# Patient Record
Sex: FEMALE | Race: WHITE | Employment: OTHER | URBAN - NONMETROPOLITAN AREA
[De-identification: names, ages, dates, MRNs, and addresses within clinical notes are randomized per-mention and may not be internally consistent; named-entity substitution may affect disease eponyms.]

---

## 2017-03-22 ENCOUNTER — HOSPITAL ENCOUNTER (OUTPATIENT)
Age: 62
Discharge: HOME | End: 2017-03-22
Payer: COMMERCIAL

## 2017-03-22 DIAGNOSIS — Z00.00 ANNUAL PHYSICAL EXAM: ICD-10-CM

## 2017-03-22 LAB
-: ABNORMAL
AMORPHOUS: ABNORMAL
BACTERIA: ABNORMAL
BILIRUBIN URINE: NEGATIVE
CASTS UA: ABNORMAL /LPF
COLOR: YELLOW
COMMENT UA: ABNORMAL
CRYSTALS, UA: ABNORMAL /HPF
EPITHELIAL CELLS UA: ABNORMAL /HPF (ref 0–25)
GLUCOSE URINE: NEGATIVE
KETONES, URINE: NEGATIVE
LEUKOCYTE ESTERASE, URINE: NEGATIVE
MUCUS: ABNORMAL
NITRITE, URINE: NEGATIVE
OTHER OBSERVATIONS UA: ABNORMAL
PH UA: 5.5 (ref 5–9)
PROTEIN UA: ABNORMAL
RBC UA: ABNORMAL /HPF (ref 0–2)
RENAL EPITHELIAL, UA: ABNORMAL /HPF
SPECIFIC GRAVITY UA: 1.02 (ref 1.01–1.02)
TRICHOMONAS: ABNORMAL
TURBIDITY: CLEAR
URINE HGB: ABNORMAL
UROBILINOGEN, URINE: NORMAL
WBC UA: ABNORMAL /HPF (ref 0–5)
YEAST: ABNORMAL

## 2017-03-22 PROCEDURE — 81001 URINALYSIS AUTO W/SCOPE: CPT

## 2017-03-22 PROCEDURE — G0145 SCR C/V CYTO,THINLAYER,RESCR: HCPCS

## 2017-03-27 LAB — CYTOLOGY REPORT: NORMAL

## 2017-03-28 ENCOUNTER — HOSPITAL ENCOUNTER (OUTPATIENT)
Age: 62
Setting detail: SPECIMEN
Discharge: HOME | End: 2017-03-28
Payer: COMMERCIAL

## 2017-03-28 ENCOUNTER — OFFICE VISIT (OUTPATIENT)
Dept: UROLOGY | Age: 62
End: 2017-03-28
Payer: COMMERCIAL

## 2017-03-28 VITALS
TEMPERATURE: 98.1 F | HEIGHT: 66 IN | SYSTOLIC BLOOD PRESSURE: 144 MMHG | WEIGHT: 220 LBS | BODY MASS INDEX: 35.36 KG/M2 | DIASTOLIC BLOOD PRESSURE: 84 MMHG

## 2017-03-28 DIAGNOSIS — R31.29 MICROHEMATURIA: Primary | ICD-10-CM

## 2017-03-28 DIAGNOSIS — Z87.442 PERSONAL HISTORY OF KIDNEY STONES: ICD-10-CM

## 2017-03-28 DIAGNOSIS — R10.9 LEFT FLANK PAIN: ICD-10-CM

## 2017-03-28 DIAGNOSIS — N39.41 URGE INCONTINENCE: ICD-10-CM

## 2017-03-28 PROCEDURE — 99204 OFFICE O/P NEW MOD 45 MIN: CPT | Performed by: PHYSICIAN ASSISTANT

## 2017-03-28 PROCEDURE — 88112 CYTOPATH CELL ENHANCE TECH: CPT

## 2017-03-28 ASSESSMENT — ENCOUNTER SYMPTOMS
DIARRHEA: 0
NAUSEA: 0
ABDOMINAL DISTENTION: 0
SHORTNESS OF BREATH: 0
EYE PAIN: 0
ABDOMINAL PAIN: 0
CONSTIPATION: 0
VOMITING: 0
COUGH: 0

## 2017-03-29 LAB
CASE NUMBER:: NORMAL
SPECIMEN DESCRIPTION: NORMAL
SURGICAL PATHOLOGY REPORT: NORMAL

## 2017-04-06 ENCOUNTER — HOSPITAL ENCOUNTER (OUTPATIENT)
Dept: CT IMAGING | Age: 62
Discharge: HOME | End: 2017-04-06
Payer: COMMERCIAL

## 2017-04-06 DIAGNOSIS — Z87.442 PERSONAL HISTORY OF KIDNEY STONES: ICD-10-CM

## 2017-04-06 DIAGNOSIS — R10.9 LEFT FLANK PAIN: ICD-10-CM

## 2017-04-06 DIAGNOSIS — R31.29 MICROHEMATURIA: ICD-10-CM

## 2017-04-06 PROCEDURE — 6360000004 HC RX CONTRAST MEDICATION: Performed by: PHYSICIAN ASSISTANT

## 2017-04-06 PROCEDURE — 74178 CT ABD&PLV WO CNTR FLWD CNTR: CPT

## 2017-04-06 RX ADMIN — IOPAMIDOL 100 ML: 612 INJECTION, SOLUTION INTRAVENOUS at 12:22

## 2017-04-24 ENCOUNTER — HOSPITAL ENCOUNTER (OUTPATIENT)
Age: 62
Discharge: HOME | End: 2017-04-24
Payer: COMMERCIAL

## 2017-04-24 ENCOUNTER — OFFICE VISIT (OUTPATIENT)
Dept: UROLOGY | Age: 62
End: 2017-04-24
Payer: COMMERCIAL

## 2017-04-24 ENCOUNTER — HOSPITAL ENCOUNTER (OUTPATIENT)
Age: 62
Setting detail: SPECIMEN
Discharge: HOME | End: 2017-04-24
Payer: COMMERCIAL

## 2017-04-24 ENCOUNTER — HOSPITAL ENCOUNTER (OUTPATIENT)
Dept: GENERAL RADIOLOGY | Age: 62
Discharge: HOME | End: 2017-04-24
Payer: COMMERCIAL

## 2017-04-24 VITALS
TEMPERATURE: 97.9 F | WEIGHT: 218 LBS | BODY MASS INDEX: 35.73 KG/M2 | DIASTOLIC BLOOD PRESSURE: 88 MMHG | SYSTOLIC BLOOD PRESSURE: 130 MMHG

## 2017-04-24 DIAGNOSIS — N20.0 RENAL STONES: ICD-10-CM

## 2017-04-24 DIAGNOSIS — R31.9 HEMATURIA: ICD-10-CM

## 2017-04-24 DIAGNOSIS — Z01.818 PRE-OP TESTING: ICD-10-CM

## 2017-04-24 DIAGNOSIS — N20.0 RENAL STONES: Primary | ICD-10-CM

## 2017-04-24 LAB
-: ABNORMAL
ABSOLUTE EOS #: 0.2 K/UL (ref 0–0.4)
ABSOLUTE LYMPH #: 2.3 K/UL (ref 1–4.8)
ABSOLUTE MONO #: 0.7 K/UL (ref 0–1)
AMORPHOUS: ABNORMAL
ANION GAP SERPL CALCULATED.3IONS-SCNC: 14 MMOL/L (ref 9–17)
BACTERIA: ABNORMAL
BASOPHILS # BLD: 1 %
BASOPHILS ABSOLUTE: 0.1 K/UL (ref 0–0.2)
BILIRUBIN URINE: NEGATIVE
BUN BLDV-MCNC: 16 MG/DL (ref 8–23)
BUN/CREAT BLD: 21 (ref 9–20)
CALCIUM SERPL-MCNC: 9.7 MG/DL (ref 8.6–10.4)
CASTS UA: ABNORMAL /LPF
CHLORIDE BLD-SCNC: 102 MMOL/L (ref 98–107)
CO2: 27 MMOL/L (ref 20–31)
COLOR: YELLOW
COMMENT UA: ABNORMAL
CREAT SERPL-MCNC: 0.76 MG/DL (ref 0.5–0.9)
CRYSTALS, UA: ABNORMAL /HPF
DIFFERENTIAL TYPE: NORMAL
EOSINOPHILS RELATIVE PERCENT: 2 %
EPITHELIAL CELLS UA: ABNORMAL /HPF (ref 0–25)
GFR AFRICAN AMERICAN: >60 ML/MIN
GFR NON-AFRICAN AMERICAN: >60 ML/MIN
GFR SERPL CREATININE-BSD FRML MDRD: ABNORMAL ML/MIN/{1.73_M2}
GFR SERPL CREATININE-BSD FRML MDRD: ABNORMAL ML/MIN/{1.73_M2}
GLUCOSE BLD-MCNC: 85 MG/DL (ref 70–99)
GLUCOSE URINE: NEGATIVE
HCT VFR BLD CALC: 42.8 % (ref 36–46)
HEMOGLOBIN: 14.1 G/DL (ref 12–16)
KETONES, URINE: NEGATIVE
LEUKOCYTE ESTERASE, URINE: ABNORMAL
LYMPHOCYTES # BLD: 27 %
MCH RBC QN AUTO: 29.2 PG (ref 26–34)
MCHC RBC AUTO-ENTMCNC: 32.9 G/DL (ref 31–37)
MCV RBC AUTO: 88.7 FL (ref 80–100)
MONOCYTES # BLD: 8 %
MUCUS: ABNORMAL
NITRITE, URINE: NEGATIVE
OTHER OBSERVATIONS UA: ABNORMAL
PDW BLD-RTO: 13.2 % (ref 12.1–15.2)
PH UA: 5.5 (ref 5–9)
PLATELET # BLD: 269 K/UL (ref 140–450)
PLATELET ESTIMATE: NORMAL
PMV BLD AUTO: NORMAL FL (ref 6–12)
POTASSIUM SERPL-SCNC: 4 MMOL/L (ref 3.7–5.3)
PROTEIN UA: ABNORMAL
RBC # BLD: 4.83 M/UL (ref 4–5.2)
RBC # BLD: NORMAL 10*6/UL
RBC UA: ABNORMAL /HPF (ref 0–2)
RENAL EPITHELIAL, UA: ABNORMAL /HPF
SEG NEUTROPHILS: 62 %
SEGMENTED NEUTROPHILS ABSOLUTE COUNT: 5.4 K/UL (ref 1.8–7.7)
SODIUM BLD-SCNC: 143 MMOL/L (ref 135–144)
SPECIFIC GRAVITY UA: >1.03 (ref 1.01–1.02)
TRICHOMONAS: ABNORMAL
TURBIDITY: ABNORMAL
URINE HGB: ABNORMAL
UROBILINOGEN, URINE: NORMAL
WBC # BLD: 8.7 K/UL (ref 3.5–11)
WBC # BLD: NORMAL 10*3/UL
WBC UA: ABNORMAL /HPF (ref 0–5)
YEAST: ABNORMAL

## 2017-04-24 PROCEDURE — 36415 COLL VENOUS BLD VENIPUNCTURE: CPT

## 2017-04-24 PROCEDURE — 80048 BASIC METABOLIC PNL TOTAL CA: CPT

## 2017-04-24 PROCEDURE — 99214 OFFICE O/P EST MOD 30 MIN: CPT | Performed by: UROLOGY

## 2017-04-24 PROCEDURE — 85025 COMPLETE CBC W/AUTO DIFF WBC: CPT

## 2017-04-24 PROCEDURE — 87086 URINE CULTURE/COLONY COUNT: CPT

## 2017-04-24 PROCEDURE — 74000 XR ABDOMEN LIMITED (KUB): CPT

## 2017-04-24 PROCEDURE — 93005 ELECTROCARDIOGRAM TRACING: CPT

## 2017-04-24 PROCEDURE — 81001 URINALYSIS AUTO W/SCOPE: CPT

## 2017-04-24 ASSESSMENT — ENCOUNTER SYMPTOMS
EYE PAIN: 0
EYE REDNESS: 0
ABDOMINAL PAIN: 0
COUGH: 0
WHEEZING: 0
BACK PAIN: 0
SHORTNESS OF BREATH: 0
COLOR CHANGE: 0
NAUSEA: 0
VOMITING: 0

## 2017-04-25 LAB
EKG ATRIAL RATE: 77 BPM
EKG P AXIS: 61 DEGREES
EKG P-R INTERVAL: 150 MS
EKG Q-T INTERVAL: 372 MS
EKG QRS DURATION: 84 MS
EKG QTC CALCULATION (BAZETT): 420 MS
EKG R AXIS: -20 DEGREES
EKG T AXIS: 39 DEGREES
EKG VENTRICULAR RATE: 77 BPM

## 2017-04-27 LAB
CULTURE: NO GROWTH
CULTURE: NORMAL
Lab: NORMAL
Lab: NORMAL
SPECIMEN DESCRIPTION: NORMAL
SPECIMEN DESCRIPTION: NORMAL
STATUS: NORMAL

## 2017-05-01 ENCOUNTER — ANESTHESIA EVENT (OUTPATIENT)
Dept: OPERATING ROOM | Age: 62
End: 2017-05-01
Payer: COMMERCIAL

## 2017-05-02 ENCOUNTER — HOSPITAL ENCOUNTER (OUTPATIENT)
Age: 62
Setting detail: OUTPATIENT SURGERY
Discharge: HOME | End: 2017-05-02
Attending: UROLOGY | Admitting: UROLOGY
Payer: COMMERCIAL

## 2017-05-02 ENCOUNTER — ANESTHESIA (OUTPATIENT)
Dept: OPERATING ROOM | Age: 62
End: 2017-05-02
Payer: COMMERCIAL

## 2017-05-02 VITALS
DIASTOLIC BLOOD PRESSURE: 63 MMHG | BODY MASS INDEX: 35.03 KG/M2 | RESPIRATION RATE: 16 BRPM | TEMPERATURE: 97 F | HEART RATE: 81 BPM | SYSTOLIC BLOOD PRESSURE: 121 MMHG | OXYGEN SATURATION: 93 % | HEIGHT: 66 IN | WEIGHT: 218 LBS

## 2017-05-02 VITALS — DIASTOLIC BLOOD PRESSURE: 53 MMHG | TEMPERATURE: 96.8 F | SYSTOLIC BLOOD PRESSURE: 106 MMHG | OXYGEN SATURATION: 95 %

## 2017-05-02 PROCEDURE — 6360000002 HC RX W HCPCS: Performed by: NURSE ANESTHETIST, CERTIFIED REGISTERED

## 2017-05-02 PROCEDURE — 7100000010 HC PHASE II RECOVERY - FIRST 15 MIN: Performed by: UROLOGY

## 2017-05-02 PROCEDURE — 3700000001 HC ADD 15 MINUTES (ANESTHESIA): Performed by: UROLOGY

## 2017-05-02 PROCEDURE — 7100000001 HC PACU RECOVERY - ADDTL 15 MIN: Performed by: UROLOGY

## 2017-05-02 PROCEDURE — 7100000011 HC PHASE II RECOVERY - ADDTL 15 MIN: Performed by: UROLOGY

## 2017-05-02 PROCEDURE — C2617 STENT, NON-COR, TEM W/O DEL: HCPCS | Performed by: UROLOGY

## 2017-05-02 PROCEDURE — 2580000003 HC RX 258: Performed by: UROLOGY

## 2017-05-02 PROCEDURE — C1769 GUIDE WIRE: HCPCS | Performed by: UROLOGY

## 2017-05-02 PROCEDURE — 3700000000 HC ANESTHESIA ATTENDED CARE: Performed by: UROLOGY

## 2017-05-02 PROCEDURE — 2500000003 HC RX 250 WO HCPCS: Performed by: NURSE ANESTHETIST, CERTIFIED REGISTERED

## 2017-05-02 PROCEDURE — 3600000014 HC SURGERY LEVEL 4 ADDTL 15MIN: Performed by: UROLOGY

## 2017-05-02 PROCEDURE — 7100000000 HC PACU RECOVERY - FIRST 15 MIN: Performed by: UROLOGY

## 2017-05-02 PROCEDURE — 6360000002 HC RX W HCPCS: Performed by: UROLOGY

## 2017-05-02 PROCEDURE — 3600000004 HC SURGERY LEVEL 4 BASE: Performed by: UROLOGY

## 2017-05-02 DEVICE — URETERAL STENT
Type: IMPLANTABLE DEVICE | Site: URETER | Status: FUNCTIONAL
Brand: PERCUFLEX™ PLUS

## 2017-05-02 RX ORDER — DEXAMETHASONE SODIUM PHOSPHATE 4 MG/ML
INJECTION, SOLUTION INTRA-ARTICULAR; INTRALESIONAL; INTRAMUSCULAR; INTRAVENOUS; SOFT TISSUE PRN
Status: DISCONTINUED | OUTPATIENT
Start: 2017-05-02 | End: 2017-05-02 | Stop reason: SDUPTHER

## 2017-05-02 RX ORDER — LIDOCAINE HYDROCHLORIDE 20 MG/ML
INJECTION, SOLUTION INFILTRATION; PERINEURAL PRN
Status: DISCONTINUED | OUTPATIENT
Start: 2017-05-02 | End: 2017-05-02 | Stop reason: SDUPTHER

## 2017-05-02 RX ORDER — ACETAMINOPHEN 10 MG/ML
INJECTION, SOLUTION INTRAVENOUS PRN
Status: DISCONTINUED | OUTPATIENT
Start: 2017-05-02 | End: 2017-05-02 | Stop reason: SDUPTHER

## 2017-05-02 RX ORDER — KETOROLAC TROMETHAMINE 30 MG/ML
INJECTION, SOLUTION INTRAMUSCULAR; INTRAVENOUS PRN
Status: DISCONTINUED | OUTPATIENT
Start: 2017-05-02 | End: 2017-05-02 | Stop reason: SDUPTHER

## 2017-05-02 RX ORDER — EPHEDRINE SULFATE 50 MG/ML
INJECTION, SOLUTION INTRAVENOUS PRN
Status: DISCONTINUED | OUTPATIENT
Start: 2017-05-02 | End: 2017-05-02 | Stop reason: SDUPTHER

## 2017-05-02 RX ORDER — FENTANYL CITRATE 50 UG/ML
INJECTION, SOLUTION INTRAMUSCULAR; INTRAVENOUS PRN
Status: DISCONTINUED | OUTPATIENT
Start: 2017-05-02 | End: 2017-05-02 | Stop reason: SDUPTHER

## 2017-05-02 RX ORDER — SODIUM CHLORIDE, SODIUM LACTATE, POTASSIUM CHLORIDE, CALCIUM CHLORIDE 600; 310; 30; 20 MG/100ML; MG/100ML; MG/100ML; MG/100ML
INJECTION, SOLUTION INTRAVENOUS CONTINUOUS
Status: DISCONTINUED | OUTPATIENT
Start: 2017-05-02 | End: 2017-05-02 | Stop reason: HOSPADM

## 2017-05-02 RX ORDER — PROPOFOL 10 MG/ML
INJECTION, EMULSION INTRAVENOUS PRN
Status: DISCONTINUED | OUTPATIENT
Start: 2017-05-02 | End: 2017-05-02 | Stop reason: SDUPTHER

## 2017-05-02 RX ORDER — CIPROFLOXACIN 2 MG/ML
400 INJECTION, SOLUTION INTRAVENOUS
Status: COMPLETED | OUTPATIENT
Start: 2017-05-02 | End: 2017-05-02

## 2017-05-02 RX ORDER — ONDANSETRON 2 MG/ML
INJECTION INTRAMUSCULAR; INTRAVENOUS PRN
Status: DISCONTINUED | OUTPATIENT
Start: 2017-05-02 | End: 2017-05-02 | Stop reason: SDUPTHER

## 2017-05-02 RX ORDER — GLYCOPYRROLATE 0.2 MG/ML
INJECTION INTRAMUSCULAR; INTRAVENOUS PRN
Status: DISCONTINUED | OUTPATIENT
Start: 2017-05-02 | End: 2017-05-02 | Stop reason: SDUPTHER

## 2017-05-02 RX ORDER — MIDAZOLAM HYDROCHLORIDE 1 MG/ML
INJECTION INTRAMUSCULAR; INTRAVENOUS PRN
Status: DISCONTINUED | OUTPATIENT
Start: 2017-05-02 | End: 2017-05-02 | Stop reason: SDUPTHER

## 2017-05-02 RX ADMIN — EPHEDRINE SULFATE 15 MG: 50 INJECTION INTRAMUSCULAR; INTRAVENOUS; SUBCUTANEOUS at 14:52

## 2017-05-02 RX ADMIN — SODIUM CHLORIDE, POTASSIUM CHLORIDE, SODIUM LACTATE AND CALCIUM CHLORIDE: 600; 310; 30; 20 INJECTION, SOLUTION INTRAVENOUS at 12:01

## 2017-05-02 RX ADMIN — PHENYLEPHRINE HYDROCHLORIDE 200 MCG: 10 INJECTION INTRAVENOUS at 14:02

## 2017-05-02 RX ADMIN — PHENYLEPHRINE HYDROCHLORIDE 100 MCG: 10 INJECTION INTRAVENOUS at 14:12

## 2017-05-02 RX ADMIN — PHENYLEPHRINE HYDROCHLORIDE 200 MCG: 10 INJECTION INTRAVENOUS at 14:14

## 2017-05-02 RX ADMIN — FENTANYL CITRATE 50 MCG: 50 INJECTION INTRAMUSCULAR; INTRAVENOUS at 13:55

## 2017-05-02 RX ADMIN — EPHEDRINE SULFATE 10 MG: 50 INJECTION INTRAMUSCULAR; INTRAVENOUS; SUBCUTANEOUS at 14:45

## 2017-05-02 RX ADMIN — PHENYLEPHRINE HYDROCHLORIDE 200 MCG: 10 INJECTION INTRAVENOUS at 14:09

## 2017-05-02 RX ADMIN — PHENYLEPHRINE HYDROCHLORIDE 150 MCG: 10 INJECTION INTRAVENOUS at 14:05

## 2017-05-02 RX ADMIN — SODIUM CHLORIDE, POTASSIUM CHLORIDE, SODIUM LACTATE AND CALCIUM CHLORIDE: 600; 310; 30; 20 INJECTION, SOLUTION INTRAVENOUS at 14:40

## 2017-05-02 RX ADMIN — CIPROFLOXACIN 400 MG: 2 INJECTION, SOLUTION INTRAVENOUS at 13:47

## 2017-05-02 RX ADMIN — PHENYLEPHRINE HYDROCHLORIDE 150 MCG: 10 INJECTION INTRAVENOUS at 14:36

## 2017-05-02 RX ADMIN — MIDAZOLAM HYDROCHLORIDE 2 MG: 1 INJECTION, SOLUTION INTRAMUSCULAR; INTRAVENOUS at 13:51

## 2017-05-02 RX ADMIN — PROPOFOL 200 MG: 10 INJECTION, EMULSION INTRAVENOUS at 13:55

## 2017-05-02 RX ADMIN — ACETAMINOPHEN 1000 MG: 10 INJECTION, SOLUTION INTRAVENOUS at 14:32

## 2017-05-02 RX ADMIN — LIDOCAINE HYDROCHLORIDE 100 MG: 20 INJECTION, SOLUTION INFILTRATION; PERINEURAL at 13:55

## 2017-05-02 RX ADMIN — DEXAMETHASONE SODIUM PHOSPHATE 4 MG: 4 INJECTION, SOLUTION INTRAMUSCULAR; INTRAVENOUS at 14:13

## 2017-05-02 RX ADMIN — GLYCOPYRROLATE 0.2 MG: 0.2 INJECTION, SOLUTION INTRAMUSCULAR; INTRAVENOUS at 14:12

## 2017-05-02 RX ADMIN — PHENYLEPHRINE HYDROCHLORIDE 200 MCG: 10 INJECTION INTRAVENOUS at 14:24

## 2017-05-02 RX ADMIN — PHENYLEPHRINE HYDROCHLORIDE 150 MCG: 10 INJECTION INTRAVENOUS at 14:41

## 2017-05-02 RX ADMIN — PHENYLEPHRINE HYDROCHLORIDE 200 MCG: 10 INJECTION INTRAVENOUS at 14:16

## 2017-05-02 RX ADMIN — ONDANSETRON 4 MG: 2 INJECTION INTRAMUSCULAR; INTRAVENOUS at 14:23

## 2017-05-02 RX ADMIN — KETOROLAC TROMETHAMINE 30 MG: 30 INJECTION, SOLUTION INTRAMUSCULAR at 14:47

## 2017-05-02 RX ADMIN — PHENYLEPHRINE HYDROCHLORIDE 200 MCG: 10 INJECTION INTRAVENOUS at 14:31

## 2017-05-02 ASSESSMENT — PAIN SCALES - GENERAL
PAINLEVEL_OUTOF10: 0

## 2017-05-02 ASSESSMENT — PAIN - FUNCTIONAL ASSESSMENT: PAIN_FUNCTIONAL_ASSESSMENT: 0-10

## 2017-05-03 ENCOUNTER — HOSPITAL ENCOUNTER (EMERGENCY)
Age: 62
Discharge: HOME | End: 2017-05-03
Attending: EMERGENCY MEDICINE
Payer: COMMERCIAL

## 2017-05-03 VITALS
DIASTOLIC BLOOD PRESSURE: 73 MMHG | OXYGEN SATURATION: 91 % | RESPIRATION RATE: 18 BRPM | TEMPERATURE: 98.7 F | HEART RATE: 71 BPM | SYSTOLIC BLOOD PRESSURE: 134 MMHG

## 2017-05-03 DIAGNOSIS — N20.0 RENAL STONES: Primary | ICD-10-CM

## 2017-05-03 DIAGNOSIS — N23 RENAL COLIC ON LEFT SIDE: Primary | ICD-10-CM

## 2017-05-03 LAB
-: ABNORMAL
ABSOLUTE BANDS #: 0.12 K/UL (ref 0–1)
ABSOLUTE EOS #: 0 K/UL (ref 0–0.4)
ABSOLUTE LYMPH #: 0.95 K/UL (ref 1–4.8)
ABSOLUTE MONO #: 0.48 K/UL (ref 0–1)
AMORPHOUS: ABNORMAL
ANION GAP SERPL CALCULATED.3IONS-SCNC: 15 MMOL/L (ref 9–17)
ATYPICAL LYMPHOCYTE ABSOLUTE COUNT: 0.12 K/UL
ATYPICAL LYMPHOCYTES: 1 %
BACTERIA: ABNORMAL
BANDS: 1 %
BASOPHILS # BLD: 0 %
BASOPHILS ABSOLUTE: 0 K/UL (ref 0–0.2)
BILIRUBIN URINE: NEGATIVE
BUN BLDV-MCNC: 14 MG/DL (ref 8–23)
BUN/CREAT BLD: 18 (ref 9–20)
CALCIUM SERPL-MCNC: 9.7 MG/DL (ref 8.6–10.4)
CASTS UA: ABNORMAL /LPF
CHLORIDE BLD-SCNC: 100 MMOL/L (ref 98–107)
CO2: 22 MMOL/L (ref 20–31)
COLOR: ABNORMAL
COMMENT UA: ABNORMAL
CREAT SERPL-MCNC: 0.77 MG/DL (ref 0.5–0.9)
CRYSTALS, UA: ABNORMAL /HPF
DIFFERENTIAL TYPE: ABNORMAL
EOSINOPHILS RELATIVE PERCENT: 0 %
EPITHELIAL CELLS UA: ABNORMAL /HPF (ref 0–25)
GFR AFRICAN AMERICAN: >60 ML/MIN
GFR NON-AFRICAN AMERICAN: >60 ML/MIN
GFR SERPL CREATININE-BSD FRML MDRD: ABNORMAL ML/MIN/{1.73_M2}
GFR SERPL CREATININE-BSD FRML MDRD: ABNORMAL ML/MIN/{1.73_M2}
GLUCOSE BLD-MCNC: 149 MG/DL (ref 70–99)
GLUCOSE URINE: NEGATIVE
HCT VFR BLD CALC: 41.7 % (ref 36–46)
HEMOGLOBIN: 14.1 G/DL (ref 12–16)
KETONES, URINE: NEGATIVE
LEUKOCYTE ESTERASE, URINE: ABNORMAL
LYMPHOCYTES # BLD: 8 %
MCH RBC QN AUTO: 29.5 PG (ref 26–34)
MCHC RBC AUTO-ENTMCNC: 33.8 G/DL (ref 31–37)
MCV RBC AUTO: 87.3 FL (ref 80–100)
MONOCYTES # BLD: 4 %
MORPHOLOGY: NORMAL
MUCUS: ABNORMAL
NITRITE, URINE: NEGATIVE
OTHER OBSERVATIONS UA: ABNORMAL
PDW BLD-RTO: 13.1 % (ref 12.1–15.2)
PH UA: 6 (ref 5–9)
PLATELET # BLD: 295 K/UL (ref 140–450)
PLATELET ESTIMATE: ABNORMAL
PMV BLD AUTO: ABNORMAL FL (ref 6–12)
POTASSIUM SERPL-SCNC: 4.2 MMOL/L (ref 3.7–5.3)
PROTEIN UA: ABNORMAL
RBC # BLD: 4.78 M/UL (ref 4–5.2)
RBC # BLD: ABNORMAL 10*6/UL
RBC UA: ABNORMAL /HPF (ref 0–2)
RENAL EPITHELIAL, UA: ABNORMAL /HPF
SEG NEUTROPHILS: 86 %
SEGMENTED NEUTROPHILS ABSOLUTE COUNT: 10.23 K/UL (ref 1.8–7.7)
SODIUM BLD-SCNC: 137 MMOL/L (ref 135–144)
SPECIFIC GRAVITY UA: 1.02 (ref 1.01–1.02)
TRICHOMONAS: ABNORMAL
TURBIDITY: ABNORMAL
URINE HGB: ABNORMAL
UROBILINOGEN, URINE: NORMAL
WBC # BLD: 11.9 K/UL (ref 3.5–11)
WBC # BLD: ABNORMAL 10*3/UL
WBC UA: ABNORMAL /HPF (ref 0–5)
YEAST: ABNORMAL

## 2017-05-03 PROCEDURE — 81001 URINALYSIS AUTO W/SCOPE: CPT

## 2017-05-03 PROCEDURE — 99283 EMERGENCY DEPT VISIT LOW MDM: CPT

## 2017-05-03 PROCEDURE — 6360000002 HC RX W HCPCS: Performed by: EMERGENCY MEDICINE

## 2017-05-03 PROCEDURE — 80048 BASIC METABOLIC PNL TOTAL CA: CPT

## 2017-05-03 PROCEDURE — 96375 TX/PRO/DX INJ NEW DRUG ADDON: CPT

## 2017-05-03 PROCEDURE — 96374 THER/PROPH/DIAG INJ IV PUSH: CPT

## 2017-05-03 PROCEDURE — 85025 COMPLETE CBC W/AUTO DIFF WBC: CPT

## 2017-05-03 PROCEDURE — 87086 URINE CULTURE/COLONY COUNT: CPT

## 2017-05-03 RX ORDER — MORPHINE SULFATE 4 MG/ML
4 INJECTION, SOLUTION INTRAMUSCULAR; INTRAVENOUS ONCE
Status: COMPLETED | OUTPATIENT
Start: 2017-05-03 | End: 2017-05-03

## 2017-05-03 RX ORDER — KETOROLAC TROMETHAMINE 15 MG/ML
15 INJECTION, SOLUTION INTRAMUSCULAR; INTRAVENOUS ONCE
Status: COMPLETED | OUTPATIENT
Start: 2017-05-03 | End: 2017-05-03

## 2017-05-03 RX ORDER — ONDANSETRON 4 MG/1
4 TABLET, FILM COATED ORAL EVERY 4 HOURS PRN
Qty: 15 TABLET | Refills: 0 | Status: SHIPPED | OUTPATIENT
Start: 2017-05-03 | End: 2017-08-21 | Stop reason: ALTCHOICE

## 2017-05-03 RX ORDER — ONDANSETRON 2 MG/ML
4 INJECTION INTRAMUSCULAR; INTRAVENOUS ONCE
Status: COMPLETED | OUTPATIENT
Start: 2017-05-03 | End: 2017-05-03

## 2017-05-03 RX ORDER — HYDROCODONE BITARTRATE AND ACETAMINOPHEN 5; 325 MG/1; MG/1
1 TABLET ORAL EVERY 4 HOURS PRN
Qty: 20 TABLET | Refills: 0 | Status: SHIPPED | OUTPATIENT
Start: 2017-05-03 | End: 2017-08-21 | Stop reason: ALTCHOICE

## 2017-05-03 RX ADMIN — MORPHINE SULFATE 4 MG: 4 INJECTION, SOLUTION INTRAMUSCULAR; INTRAVENOUS at 06:25

## 2017-05-03 RX ADMIN — KETOROLAC TROMETHAMINE 15 MG: 15 INJECTION, SOLUTION INTRAMUSCULAR; INTRAVENOUS at 05:20

## 2017-05-03 RX ADMIN — ONDANSETRON 4 MG: 2 INJECTION INTRAMUSCULAR; INTRAVENOUS at 05:20

## 2017-05-03 ASSESSMENT — PAIN SCALES - GENERAL
PAINLEVEL_OUTOF10: 5
PAINLEVEL_OUTOF10: 9
PAINLEVEL_OUTOF10: 9
PAINLEVEL_OUTOF10: 4

## 2017-05-03 ASSESSMENT — ENCOUNTER SYMPTOMS
GASTROINTESTINAL NEGATIVE: 1
RESPIRATORY NEGATIVE: 1
EYES NEGATIVE: 1
ALLERGIC/IMMUNOLOGIC NEGATIVE: 1

## 2017-05-03 ASSESSMENT — PAIN DESCRIPTION - PAIN TYPE: TYPE: ACUTE PAIN

## 2017-05-03 ASSESSMENT — PAIN DESCRIPTION - LOCATION: LOCATION: ABDOMEN;BACK;FLANK

## 2017-05-03 ASSESSMENT — PAIN DESCRIPTION - ORIENTATION: ORIENTATION: LEFT

## 2017-05-08 ENCOUNTER — OFFICE VISIT (OUTPATIENT)
Dept: UROLOGY | Age: 62
End: 2017-05-08

## 2017-05-08 ENCOUNTER — HOSPITAL ENCOUNTER (OUTPATIENT)
Age: 62
Discharge: HOME | End: 2017-05-08
Payer: COMMERCIAL

## 2017-05-08 ENCOUNTER — HOSPITAL ENCOUNTER (OUTPATIENT)
Dept: GENERAL RADIOLOGY | Age: 62
Discharge: HOME | End: 2017-05-08
Payer: COMMERCIAL

## 2017-05-08 VITALS
TEMPERATURE: 97.4 F | SYSTOLIC BLOOD PRESSURE: 122 MMHG | DIASTOLIC BLOOD PRESSURE: 78 MMHG | HEIGHT: 66 IN | WEIGHT: 212 LBS | BODY MASS INDEX: 34.07 KG/M2

## 2017-05-08 DIAGNOSIS — N20.0 RENAL STONES: ICD-10-CM

## 2017-05-08 DIAGNOSIS — N20.0 RENAL STONE: Primary | ICD-10-CM

## 2017-05-08 PROCEDURE — 74000 XR ABDOMEN LIMITED (KUB): CPT

## 2017-05-08 PROCEDURE — 99024 POSTOP FOLLOW-UP VISIT: CPT | Performed by: PHYSICIAN ASSISTANT

## 2017-05-09 ENCOUNTER — ANESTHESIA EVENT (OUTPATIENT)
Dept: OPERATING ROOM | Age: 62
End: 2017-05-09
Payer: COMMERCIAL

## 2017-05-09 ENCOUNTER — HOSPITAL ENCOUNTER (OUTPATIENT)
Age: 62
Setting detail: OUTPATIENT SURGERY
Discharge: HOME | End: 2017-05-09
Attending: UROLOGY | Admitting: UROLOGY
Payer: COMMERCIAL

## 2017-05-09 ENCOUNTER — ANESTHESIA (OUTPATIENT)
Dept: OPERATING ROOM | Age: 62
End: 2017-05-09
Payer: COMMERCIAL

## 2017-05-09 VITALS — SYSTOLIC BLOOD PRESSURE: 103 MMHG | OXYGEN SATURATION: 92 % | TEMPERATURE: 102.8 F | DIASTOLIC BLOOD PRESSURE: 45 MMHG

## 2017-05-09 VITALS
HEIGHT: 66 IN | BODY MASS INDEX: 34.07 KG/M2 | OXYGEN SATURATION: 98 % | HEART RATE: 74 BPM | WEIGHT: 212 LBS | DIASTOLIC BLOOD PRESSURE: 81 MMHG | TEMPERATURE: 97.7 F | SYSTOLIC BLOOD PRESSURE: 127 MMHG | RESPIRATION RATE: 18 BRPM

## 2017-05-09 PROCEDURE — 2500000003 HC RX 250 WO HCPCS: Performed by: NURSE ANESTHETIST, CERTIFIED REGISTERED

## 2017-05-09 PROCEDURE — 3600000004 HC SURGERY LEVEL 4 BASE: Performed by: UROLOGY

## 2017-05-09 PROCEDURE — 6360000002 HC RX W HCPCS: Performed by: NURSE ANESTHETIST, CERTIFIED REGISTERED

## 2017-05-09 PROCEDURE — 2580000003 HC RX 258: Performed by: UROLOGY

## 2017-05-09 PROCEDURE — 6360000002 HC RX W HCPCS: Performed by: UROLOGY

## 2017-05-09 PROCEDURE — 7100000001 HC PACU RECOVERY - ADDTL 15 MIN: Performed by: UROLOGY

## 2017-05-09 PROCEDURE — 7100000010 HC PHASE II RECOVERY - FIRST 15 MIN: Performed by: UROLOGY

## 2017-05-09 PROCEDURE — 7100000000 HC PACU RECOVERY - FIRST 15 MIN: Performed by: UROLOGY

## 2017-05-09 PROCEDURE — 3600000014 HC SURGERY LEVEL 4 ADDTL 15MIN: Performed by: UROLOGY

## 2017-05-09 PROCEDURE — 3700000001 HC ADD 15 MINUTES (ANESTHESIA): Performed by: UROLOGY

## 2017-05-09 PROCEDURE — 3700000000 HC ANESTHESIA ATTENDED CARE: Performed by: UROLOGY

## 2017-05-09 PROCEDURE — 7100000011 HC PHASE II RECOVERY - ADDTL 15 MIN: Performed by: UROLOGY

## 2017-05-09 RX ORDER — OXYCODONE HYDROCHLORIDE AND ACETAMINOPHEN 5; 325 MG/1; MG/1
1 TABLET ORAL
Status: DISCONTINUED | OUTPATIENT
Start: 2017-05-09 | End: 2017-05-09 | Stop reason: HOSPADM

## 2017-05-09 RX ORDER — MEPERIDINE HYDROCHLORIDE 50 MG/ML
12.5 INJECTION INTRAMUSCULAR; INTRAVENOUS; SUBCUTANEOUS EVERY 5 MIN PRN
Status: DISCONTINUED | OUTPATIENT
Start: 2017-05-09 | End: 2017-05-09 | Stop reason: HOSPADM

## 2017-05-09 RX ORDER — LIDOCAINE HYDROCHLORIDE 20 MG/ML
INJECTION, SOLUTION INFILTRATION; PERINEURAL PRN
Status: DISCONTINUED | OUTPATIENT
Start: 2017-05-09 | End: 2017-05-09 | Stop reason: SDUPTHER

## 2017-05-09 RX ORDER — SODIUM CHLORIDE 0.9 % (FLUSH) 0.9 %
10 SYRINGE (ML) INJECTION PRN
Status: DISCONTINUED | OUTPATIENT
Start: 2017-05-09 | End: 2017-05-09 | Stop reason: HOSPADM

## 2017-05-09 RX ORDER — ONDANSETRON 2 MG/ML
4 INJECTION INTRAMUSCULAR; INTRAVENOUS
Status: DISCONTINUED | OUTPATIENT
Start: 2017-05-09 | End: 2017-05-09 | Stop reason: HOSPADM

## 2017-05-09 RX ORDER — MIDAZOLAM HYDROCHLORIDE 1 MG/ML
INJECTION INTRAMUSCULAR; INTRAVENOUS PRN
Status: DISCONTINUED | OUTPATIENT
Start: 2017-05-09 | End: 2017-05-09 | Stop reason: SDUPTHER

## 2017-05-09 RX ORDER — LABETALOL HYDROCHLORIDE 5 MG/ML
5 INJECTION, SOLUTION INTRAVENOUS EVERY 10 MIN PRN
Status: DISCONTINUED | OUTPATIENT
Start: 2017-05-09 | End: 2017-05-09 | Stop reason: HOSPADM

## 2017-05-09 RX ORDER — SODIUM CHLORIDE, SODIUM LACTATE, POTASSIUM CHLORIDE, CALCIUM CHLORIDE 600; 310; 30; 20 MG/100ML; MG/100ML; MG/100ML; MG/100ML
INJECTION, SOLUTION INTRAVENOUS CONTINUOUS
Status: DISCONTINUED | OUTPATIENT
Start: 2017-05-09 | End: 2017-05-09 | Stop reason: HOSPADM

## 2017-05-09 RX ORDER — PROPOFOL 10 MG/ML
INJECTION, EMULSION INTRAVENOUS PRN
Status: DISCONTINUED | OUTPATIENT
Start: 2017-05-09 | End: 2017-05-09 | Stop reason: SDUPTHER

## 2017-05-09 RX ORDER — FENTANYL CITRATE 50 UG/ML
25 INJECTION, SOLUTION INTRAMUSCULAR; INTRAVENOUS EVERY 5 MIN PRN
Status: DISCONTINUED | OUTPATIENT
Start: 2017-05-09 | End: 2017-05-09 | Stop reason: HOSPADM

## 2017-05-09 RX ORDER — FENTANYL CITRATE 50 UG/ML
INJECTION, SOLUTION INTRAMUSCULAR; INTRAVENOUS PRN
Status: DISCONTINUED | OUTPATIENT
Start: 2017-05-09 | End: 2017-05-09 | Stop reason: SDUPTHER

## 2017-05-09 RX ORDER — CIPROFLOXACIN 2 MG/ML
400 INJECTION, SOLUTION INTRAVENOUS
Status: COMPLETED | OUTPATIENT
Start: 2017-05-09 | End: 2017-05-09

## 2017-05-09 RX ORDER — ONDANSETRON 2 MG/ML
INJECTION INTRAMUSCULAR; INTRAVENOUS PRN
Status: DISCONTINUED | OUTPATIENT
Start: 2017-05-09 | End: 2017-05-09 | Stop reason: SDUPTHER

## 2017-05-09 RX ORDER — PROMETHAZINE HYDROCHLORIDE 25 MG/ML
6.25 INJECTION, SOLUTION INTRAMUSCULAR; INTRAVENOUS
Status: DISCONTINUED | OUTPATIENT
Start: 2017-05-09 | End: 2017-05-09 | Stop reason: HOSPADM

## 2017-05-09 RX ORDER — SODIUM CHLORIDE 0.9 % (FLUSH) 0.9 %
10 SYRINGE (ML) INJECTION EVERY 12 HOURS SCHEDULED
Status: DISCONTINUED | OUTPATIENT
Start: 2017-05-09 | End: 2017-05-09 | Stop reason: HOSPADM

## 2017-05-09 RX ORDER — DEXAMETHASONE SODIUM PHOSPHATE 4 MG/ML
INJECTION, SOLUTION INTRA-ARTICULAR; INTRALESIONAL; INTRAMUSCULAR; INTRAVENOUS; SOFT TISSUE PRN
Status: DISCONTINUED | OUTPATIENT
Start: 2017-05-09 | End: 2017-05-09 | Stop reason: SDUPTHER

## 2017-05-09 RX ADMIN — PROPOFOL 200 MG: 10 INJECTION, EMULSION INTRAVENOUS at 17:15

## 2017-05-09 RX ADMIN — LIDOCAINE HYDROCHLORIDE 100 MG: 20 INJECTION, SOLUTION INFILTRATION; PERINEURAL at 17:15

## 2017-05-09 RX ADMIN — CIPROFLOXACIN 400 MG: 2 INJECTION, SOLUTION INTRAVENOUS at 17:02

## 2017-05-09 RX ADMIN — ONDANSETRON 4 MG: 2 INJECTION INTRAMUSCULAR; INTRAVENOUS at 17:47

## 2017-05-09 RX ADMIN — SODIUM CHLORIDE, POTASSIUM CHLORIDE, SODIUM LACTATE AND CALCIUM CHLORIDE: 600; 310; 30; 20 INJECTION, SOLUTION INTRAVENOUS at 13:52

## 2017-05-09 RX ADMIN — FENTANYL CITRATE 50 MCG: 50 INJECTION INTRAMUSCULAR; INTRAVENOUS at 17:15

## 2017-05-09 RX ADMIN — DEXAMETHASONE SODIUM PHOSPHATE 4 MG: 4 INJECTION, SOLUTION INTRAMUSCULAR; INTRAVENOUS at 17:46

## 2017-05-09 RX ADMIN — MIDAZOLAM HYDROCHLORIDE 2 MG: 1 INJECTION, SOLUTION INTRAMUSCULAR; INTRAVENOUS at 17:15

## 2017-05-09 RX ADMIN — SODIUM CHLORIDE, POTASSIUM CHLORIDE, SODIUM LACTATE AND CALCIUM CHLORIDE: 600; 310; 30; 20 INJECTION, SOLUTION INTRAVENOUS at 17:05

## 2017-05-09 ASSESSMENT — PAIN SCALES - GENERAL
PAINLEVEL_OUTOF10: 0

## 2017-05-09 ASSESSMENT — PAIN - FUNCTIONAL ASSESSMENT: PAIN_FUNCTIONAL_ASSESSMENT: 0-10

## 2017-05-17 ENCOUNTER — OFFICE VISIT (OUTPATIENT)
Dept: UROLOGY | Age: 62
End: 2017-05-17
Payer: COMMERCIAL

## 2017-05-17 VITALS
SYSTOLIC BLOOD PRESSURE: 130 MMHG | BODY MASS INDEX: 34.99 KG/M2 | DIASTOLIC BLOOD PRESSURE: 90 MMHG | HEIGHT: 65 IN | WEIGHT: 210 LBS

## 2017-05-17 DIAGNOSIS — N20.0 RENAL STONES: Primary | ICD-10-CM

## 2017-05-17 PROCEDURE — 52310 CYSTOSCOPY AND TREATMENT: CPT | Performed by: UROLOGY

## 2017-08-17 ENCOUNTER — HOSPITAL ENCOUNTER (OUTPATIENT)
Age: 62
Discharge: HOME | End: 2017-08-17
Payer: COMMERCIAL

## 2017-08-17 ENCOUNTER — HOSPITAL ENCOUNTER (OUTPATIENT)
Dept: GENERAL RADIOLOGY | Age: 62
Discharge: HOME | End: 2017-08-17
Payer: COMMERCIAL

## 2017-08-17 DIAGNOSIS — N20.0 RENAL STONES: ICD-10-CM

## 2017-08-17 PROCEDURE — 74000 XR ABDOMEN LIMITED (KUB): CPT

## 2017-08-21 ENCOUNTER — OFFICE VISIT (OUTPATIENT)
Dept: UROLOGY | Age: 62
End: 2017-08-21
Payer: COMMERCIAL

## 2017-08-21 ENCOUNTER — HOSPITAL ENCOUNTER (OUTPATIENT)
Age: 62
Setting detail: SPECIMEN
Discharge: HOME | End: 2017-08-21
Payer: COMMERCIAL

## 2017-08-21 VITALS
SYSTOLIC BLOOD PRESSURE: 130 MMHG | DIASTOLIC BLOOD PRESSURE: 80 MMHG | HEIGHT: 66 IN | BODY MASS INDEX: 34.55 KG/M2 | WEIGHT: 215 LBS

## 2017-08-21 DIAGNOSIS — R31.9 HEMATURIA: ICD-10-CM

## 2017-08-21 DIAGNOSIS — N20.0 RENAL STONES: ICD-10-CM

## 2017-08-21 DIAGNOSIS — N20.0 RENAL STONES: Primary | ICD-10-CM

## 2017-08-21 LAB
-: ABNORMAL
AMORPHOUS: ABNORMAL
BACTERIA: ABNORMAL
BILIRUBIN URINE: NEGATIVE
CASTS UA: ABNORMAL /LPF
COLOR: YELLOW
COMMENT UA: ABNORMAL
CRYSTALS, UA: ABNORMAL /HPF
EPITHELIAL CELLS UA: ABNORMAL /HPF (ref 0–25)
GLUCOSE URINE: NEGATIVE
KETONES, URINE: NEGATIVE
LEUKOCYTE ESTERASE, URINE: ABNORMAL
MUCUS: ABNORMAL
NITRITE, URINE: NEGATIVE
OTHER OBSERVATIONS UA: ABNORMAL
PH UA: 5 (ref 5–9)
PROTEIN UA: NEGATIVE
RBC UA: ABNORMAL /HPF (ref 0–2)
RENAL EPITHELIAL, UA: ABNORMAL /HPF
SPECIFIC GRAVITY UA: >1.03 (ref 1.01–1.02)
TRICHOMONAS: ABNORMAL
TURBIDITY: CLEAR
URINE HGB: ABNORMAL
UROBILINOGEN, URINE: NORMAL
WBC UA: ABNORMAL /HPF (ref 0–5)
YEAST: ABNORMAL

## 2017-08-21 PROCEDURE — 81001 URINALYSIS AUTO W/SCOPE: CPT

## 2017-08-21 PROCEDURE — 99213 OFFICE O/P EST LOW 20 MIN: CPT | Performed by: NURSE PRACTITIONER

## 2017-08-21 PROCEDURE — 87086 URINE CULTURE/COLONY COUNT: CPT

## 2017-08-21 ASSESSMENT — ENCOUNTER SYMPTOMS
CONSTIPATION: 0
EYE REDNESS: 0
WHEEZING: 0
BACK PAIN: 0
SHORTNESS OF BREATH: 0
EYE PAIN: 0
ABDOMINAL PAIN: 0
VOMITING: 0
NAUSEA: 0
COUGH: 0
COLOR CHANGE: 0

## 2017-08-22 LAB
CULTURE: NORMAL
CULTURE: NORMAL
Lab: NORMAL
Lab: NORMAL
SPECIMEN DESCRIPTION: NORMAL
SPECIMEN DESCRIPTION: NORMAL
STATUS: NORMAL

## 2017-08-29 ENCOUNTER — HOSPITAL ENCOUNTER (OUTPATIENT)
Dept: WOMENS IMAGING | Age: 62
Discharge: HOME | End: 2017-08-29
Payer: COMMERCIAL

## 2017-08-29 DIAGNOSIS — Z12.39 SCREENING BREAST EXAMINATION: ICD-10-CM

## 2017-08-29 PROCEDURE — G0202 SCR MAMMO BI INCL CAD: HCPCS

## 2017-10-11 ENCOUNTER — HOSPITAL ENCOUNTER (OUTPATIENT)
Age: 62
Discharge: HOME | End: 2017-10-11
Payer: COMMERCIAL

## 2017-10-11 DIAGNOSIS — R73.01 IMPAIRED FASTING GLUCOSE: ICD-10-CM

## 2017-10-11 DIAGNOSIS — Z13.9 SCREENING FOR CONDITION: ICD-10-CM

## 2017-10-11 DIAGNOSIS — E78.2 MIXED HYPERLIPIDEMIA: ICD-10-CM

## 2017-10-11 LAB
ALT SERPL-CCNC: 45 U/L (ref 5–33)
ANION GAP SERPL CALCULATED.3IONS-SCNC: 14 MMOL/L (ref 9–17)
AST SERPL-CCNC: 25 U/L
BUN BLDV-MCNC: 17 MG/DL (ref 8–23)
BUN/CREAT BLD: 26 (ref 9–20)
CALCIUM SERPL-MCNC: 9.4 MG/DL (ref 8.6–10.4)
CHLORIDE BLD-SCNC: 102 MMOL/L (ref 98–107)
CHOLESTEROL/HDL RATIO: 3.7
CHOLESTEROL: 163 MG/DL
CO2: 25 MMOL/L (ref 20–31)
CREAT SERPL-MCNC: 0.65 MG/DL (ref 0.5–0.9)
ESTIMATED AVERAGE GLUCOSE: 123 MG/DL
GFR AFRICAN AMERICAN: >60 ML/MIN
GFR NON-AFRICAN AMERICAN: >60 ML/MIN
GFR SERPL CREATININE-BSD FRML MDRD: ABNORMAL ML/MIN/{1.73_M2}
GFR SERPL CREATININE-BSD FRML MDRD: ABNORMAL ML/MIN/{1.73_M2}
GLUCOSE BLD-MCNC: 111 MG/DL (ref 70–99)
HBA1C MFR BLD: 5.9 % (ref 4.8–5.9)
HDLC SERPL-MCNC: 44 MG/DL
HIV AG/AB: NONREACTIVE
LDL CHOLESTEROL: 93 MG/DL (ref 0–130)
POTASSIUM SERPL-SCNC: 4.2 MMOL/L (ref 3.7–5.3)
SODIUM BLD-SCNC: 141 MMOL/L (ref 135–144)
TRIGL SERPL-MCNC: 130 MG/DL
VLDLC SERPL CALC-MCNC: NORMAL MG/DL (ref 1–30)

## 2017-10-11 PROCEDURE — 80048 BASIC METABOLIC PNL TOTAL CA: CPT

## 2017-10-11 PROCEDURE — 87389 HIV-1 AG W/HIV-1&-2 AB AG IA: CPT

## 2017-10-11 PROCEDURE — 83036 HEMOGLOBIN GLYCOSYLATED A1C: CPT

## 2017-10-11 PROCEDURE — 36415 COLL VENOUS BLD VENIPUNCTURE: CPT

## 2017-10-11 PROCEDURE — 84450 TRANSFERASE (AST) (SGOT): CPT

## 2017-10-11 PROCEDURE — 84460 ALANINE AMINO (ALT) (SGPT): CPT

## 2017-10-11 PROCEDURE — 80061 LIPID PANEL: CPT

## 2017-10-23 ENCOUNTER — OFFICE VISIT (OUTPATIENT)
Dept: OBGYN | Age: 62
End: 2017-10-23
Payer: COMMERCIAL

## 2017-10-23 VITALS
DIASTOLIC BLOOD PRESSURE: 76 MMHG | BODY MASS INDEX: 36.65 KG/M2 | SYSTOLIC BLOOD PRESSURE: 120 MMHG | HEIGHT: 65 IN | WEIGHT: 220 LBS

## 2017-10-23 DIAGNOSIS — N89.8 VAGINAL WALL CYST: ICD-10-CM

## 2017-10-23 DIAGNOSIS — N95.2 VAGINAL ATROPHY: Primary | ICD-10-CM

## 2017-10-23 PROCEDURE — 99213 OFFICE O/P EST LOW 20 MIN: CPT | Performed by: OBSTETRICS & GYNECOLOGY

## 2017-10-23 RX ORDER — CLINDAMYCIN HYDROCHLORIDE 300 MG/1
300 CAPSULE ORAL 3 TIMES DAILY
Qty: 21 CAPSULE | Refills: 0 | Status: SHIPPED | OUTPATIENT
Start: 2017-10-23 | End: 2017-10-30

## 2017-10-23 ASSESSMENT — ENCOUNTER SYMPTOMS
ABDOMINAL PAIN: 0
SHORTNESS OF BREATH: 0
CONSTIPATION: 0
DIARRHEA: 0

## 2017-10-23 NOTE — PROGRESS NOTES
DATE OF VISIT:  10/23/17    PATIENT NAME:  Dwight Gerber     YOB: 1955    REASON FOR VISIT:    Chief Complaint   Patient presents with    Other     painful intercourse         HISTORY OF PRESENT ILLNESS:  Pt has had painful intercourse for a while. She has tried lubricants without improvement. Wants to know if there is something wrong. No LMP recorded. Patient is postmenopausal.  Vitals:    10/23/17 1741   BP: 120/76   Weight: 220 lb (99.8 kg)   Height: 5' 5\" (1.651 m)     Body mass index is 36.61 kg/m². Allergies   Allergen Reactions    Pcn [Penicillins]      AS A CHILD     Current Outpatient Prescriptions   Medication Sig Dispense Refill    clindamycin (CLEOCIN) 300 MG capsule Take 1 capsule by mouth 3 times daily for 7 days 21 capsule 0    atorvastatin (LIPITOR) 20 MG tablet Take 1 tablet by mouth daily 90 tablet 3    Vitamin D (CHOLECALCIFEROL) 1000 UNITS CAPS capsule Take 1,000 Units by mouth daily.  Multiple Vitamins-Minerals (MULTIVITAMIN PO) Take  by mouth.  Cetirizine HCl (ZYRTEC PO) Take 1 tablet by mouth daily. No current facility-administered medications for this visit. Social History     Social History    Marital status:      Spouse name: N/A    Number of children: N/A    Years of education: N/A     Social History Main Topics    Smoking status: Never Smoker    Smokeless tobacco: Never Used    Alcohol use Yes      Comment: ocassional    Drug use: No    Sexual activity: Not Asked     Other Topics Concern    None     Social History Narrative    None       REVIEW OF SYSTEMS:  Review of Systems   Constitutional: Negative for fatigue and fever. Respiratory: Negative for shortness of breath. Cardiovascular: Negative for chest pain and palpitations. Gastrointestinal: Negative for abdominal pain, constipation and diarrhea. Genitourinary: Positive for dyspareunia and vaginal pain.  Negative for dysuria, genital sores, hematuria, pelvic pain and vaginal bleeding. Has had kidney stones in the past - last treated this summer. PHYSICAL EXAM:  /76   Ht 5' 5\" (1.651 m)   Wt 220 lb (99.8 kg)   BMI 36.61 kg/m²   Physical Exam   Constitutional: She is oriented to person, place, and time. She appears well-developed and well-nourished. Genitourinary: Uterus normal. There is no rash, tenderness, lesion, injury or Bartholin's cyst on the right labia. There is no rash, tenderness, lesion, injury or Bartholin's cyst on the left labia. Vagina exhibits no rugosity (atrophy). There is tenderness (posterior vaginal wall cyst) in the vagina. No erythema or bleeding in the vagina. No foreign body in the vagina. No signs of injury around the vagina. No vaginal discharge found. Right adnexum does not display mass, does not display tenderness, does not display fullness and present. Left adnexum does not display mass, does not display tenderness, does not display fullness and present. Cervix does not exhibit motion tenderness. Uterus is anteverted. HENT:   Head: Normocephalic and atraumatic. Eyes: EOM are normal. Pupils are equal, round, and reactive to light. Neck: Normal range of motion. Cardiovascular: Normal rate and regular rhythm. Pulmonary/Chest: Effort normal.   Musculoskeletal: Normal range of motion. Neurological: She is alert and oriented to person, place, and time. Skin: Skin is warm and dry. Psychiatric: She has a normal mood and affect. Her behavior is normal. Judgment and thought content normal.       ASSESSMENT:      1. Vaginal atrophy    2. Vaginal wall cyst        PLAN:  No orders of the defined types were placed in this encounter. Return in about 4 weeks (around 11/20/2017) for follow up. Pt will stop by the [facility] office for sample of vaginal estrogen to trial as well.     Electronically signed by Avelina Posada DO on 10/23/17

## 2017-12-04 ENCOUNTER — OFFICE VISIT (OUTPATIENT)
Dept: OBGYN | Age: 62
End: 2017-12-04
Payer: COMMERCIAL

## 2017-12-04 VITALS
SYSTOLIC BLOOD PRESSURE: 120 MMHG | WEIGHT: 222 LBS | HEIGHT: 65 IN | DIASTOLIC BLOOD PRESSURE: 82 MMHG | BODY MASS INDEX: 36.99 KG/M2

## 2017-12-04 DIAGNOSIS — N89.8 VAGINAL WALL CYST: ICD-10-CM

## 2017-12-04 DIAGNOSIS — N95.2 VAGINAL ATROPHY: Primary | ICD-10-CM

## 2017-12-04 PROCEDURE — 99213 OFFICE O/P EST LOW 20 MIN: CPT | Performed by: OBSTETRICS & GYNECOLOGY

## 2017-12-04 RX ORDER — ESTRADIOL 10 UG/1
10 INSERT VAGINAL
Qty: 8 TABLET | Refills: 11 | Status: SHIPPED | OUTPATIENT
Start: 2017-12-04 | End: 2018-04-26 | Stop reason: ALTCHOICE

## 2017-12-04 NOTE — PROGRESS NOTES
range of motion. Cardiovascular: Normal rate. Pulmonary/Chest: Effort normal.   Musculoskeletal: Normal range of motion. Neurological: She is alert and oriented to person, place, and time. Skin: Skin is warm and dry. Psychiatric: She has a normal mood and affect. Her behavior is normal. Judgment and thought content normal.       ASSESSMENT:      1. Vaginal atrophy    2. Vaginal wall cyst        PLAN:  No orders of the defined types were placed in this encounter.     Return in about 1 year (around 12/4/2018) for annual.       Electronically signed by Shalini Chi DO on 12/04/17

## 2018-04-24 ENCOUNTER — HOSPITAL ENCOUNTER (OUTPATIENT)
Age: 63
Discharge: HOME | End: 2018-04-24
Payer: COMMERCIAL

## 2018-04-24 DIAGNOSIS — E78.2 MIXED HYPERLIPIDEMIA: ICD-10-CM

## 2018-04-24 DIAGNOSIS — R73.01 IMPAIRED FASTING GLUCOSE: ICD-10-CM

## 2018-04-24 LAB
ALT SERPL-CCNC: 37 U/L (ref 5–33)
ANION GAP SERPL CALCULATED.3IONS-SCNC: 12 MMOL/L (ref 9–17)
AST SERPL-CCNC: 24 U/L
BUN BLDV-MCNC: 20 MG/DL (ref 8–23)
BUN/CREAT BLD: 32 (ref 9–20)
CALCIUM SERPL-MCNC: 9.5 MG/DL (ref 8.6–10.4)
CHLORIDE BLD-SCNC: 105 MMOL/L (ref 98–107)
CHOLESTEROL/HDL RATIO: 2.8
CHOLESTEROL: 138 MG/DL
CO2: 26 MMOL/L (ref 20–31)
CREAT SERPL-MCNC: 0.62 MG/DL (ref 0.5–0.9)
ESTIMATED AVERAGE GLUCOSE: 131 MG/DL
GFR AFRICAN AMERICAN: >60 ML/MIN
GFR NON-AFRICAN AMERICAN: >60 ML/MIN
GFR SERPL CREATININE-BSD FRML MDRD: ABNORMAL ML/MIN/{1.73_M2}
GFR SERPL CREATININE-BSD FRML MDRD: ABNORMAL ML/MIN/{1.73_M2}
GLUCOSE BLD-MCNC: 118 MG/DL (ref 70–99)
HBA1C MFR BLD: 6.2 % (ref 4.8–5.9)
HCT VFR BLD CALC: 42.3 % (ref 36.3–47.1)
HDLC SERPL-MCNC: 49 MG/DL
HEMOGLOBIN: 13.9 G/DL (ref 11.9–15.1)
LDL CHOLESTEROL: 63 MG/DL (ref 0–130)
MCH RBC QN AUTO: 30.1 PG (ref 25.2–33.5)
MCHC RBC AUTO-ENTMCNC: 32.9 G/DL (ref 28.4–34.8)
MCV RBC AUTO: 91.6 FL (ref 82.6–102.9)
NRBC AUTOMATED: 0 PER 100 WBC
PDW BLD-RTO: 12.6 % (ref 11.8–14.4)
PLATELET # BLD: 277 K/UL (ref 138–453)
PMV BLD AUTO: 10.7 FL (ref 8.1–13.5)
POTASSIUM SERPL-SCNC: 4.5 MMOL/L (ref 3.7–5.3)
RBC # BLD: 4.62 M/UL (ref 3.95–5.11)
SODIUM BLD-SCNC: 143 MMOL/L (ref 135–144)
TRIGL SERPL-MCNC: 128 MG/DL
VLDLC SERPL CALC-MCNC: NORMAL MG/DL (ref 1–30)
WBC # BLD: 6.4 K/UL (ref 3.5–11.3)

## 2018-04-24 PROCEDURE — 80048 BASIC METABOLIC PNL TOTAL CA: CPT

## 2018-04-24 PROCEDURE — 80061 LIPID PANEL: CPT

## 2018-04-24 PROCEDURE — 36415 COLL VENOUS BLD VENIPUNCTURE: CPT

## 2018-04-24 PROCEDURE — 84460 ALANINE AMINO (ALT) (SGPT): CPT

## 2018-04-24 PROCEDURE — 83036 HEMOGLOBIN GLYCOSYLATED A1C: CPT

## 2018-04-24 PROCEDURE — 85027 COMPLETE CBC AUTOMATED: CPT

## 2018-04-24 PROCEDURE — 84450 TRANSFERASE (AST) (SGOT): CPT

## 2018-06-26 ENCOUNTER — OFFICE VISIT (OUTPATIENT)
Dept: UROLOGY | Age: 63
End: 2018-06-26
Payer: MEDICARE

## 2018-06-26 ENCOUNTER — HOSPITAL ENCOUNTER (EMERGENCY)
Age: 63
Discharge: HOME | End: 2018-06-26
Attending: EMERGENCY MEDICINE
Payer: MEDICARE

## 2018-06-26 ENCOUNTER — HOSPITAL ENCOUNTER (OUTPATIENT)
Age: 63
Setting detail: SPECIMEN
Discharge: HOME | End: 2018-06-26
Payer: MEDICARE

## 2018-06-26 ENCOUNTER — APPOINTMENT (OUTPATIENT)
Dept: CT IMAGING | Age: 63
End: 2018-06-26
Payer: MEDICARE

## 2018-06-26 ENCOUNTER — HOSPITAL ENCOUNTER (OUTPATIENT)
Age: 63
Discharge: HOME | End: 2018-06-26
Payer: MEDICARE

## 2018-06-26 VITALS
TEMPERATURE: 97.5 F | SYSTOLIC BLOOD PRESSURE: 130 MMHG | BODY MASS INDEX: 37.99 KG/M2 | WEIGHT: 228 LBS | DIASTOLIC BLOOD PRESSURE: 80 MMHG | HEIGHT: 65 IN

## 2018-06-26 VITALS
HEIGHT: 65 IN | OXYGEN SATURATION: 95 % | WEIGHT: 225 LBS | SYSTOLIC BLOOD PRESSURE: 122 MMHG | BODY MASS INDEX: 37.49 KG/M2 | DIASTOLIC BLOOD PRESSURE: 66 MMHG | TEMPERATURE: 98.3 F | RESPIRATION RATE: 16 BRPM | HEART RATE: 67 BPM

## 2018-06-26 DIAGNOSIS — N20.0 KIDNEY STONE: Primary | ICD-10-CM

## 2018-06-26 DIAGNOSIS — Z01.818 PRE-OP TESTING: ICD-10-CM

## 2018-06-26 DIAGNOSIS — N13.2 URETERAL STONE WITH HYDRONEPHROSIS: Primary | ICD-10-CM

## 2018-06-26 LAB
-: NORMAL
ABSOLUTE EOS #: 0.17 K/UL (ref 0–0.44)
ABSOLUTE IMMATURE GRANULOCYTE: 0.04 K/UL (ref 0–0.3)
ABSOLUTE LYMPH #: 1.53 K/UL (ref 1.1–3.7)
ABSOLUTE MONO #: 0.89 K/UL (ref 0.1–1.2)
ALBUMIN SERPL-MCNC: 4 G/DL (ref 3.5–5.2)
ALBUMIN/GLOBULIN RATIO: 1.2 (ref 1–2.5)
ALP BLD-CCNC: 104 U/L (ref 35–104)
ALT SERPL-CCNC: 34 U/L (ref 5–33)
AMORPHOUS: NORMAL
ANION GAP SERPL CALCULATED.3IONS-SCNC: 16 MMOL/L (ref 9–17)
AST SERPL-CCNC: 26 U/L
BACTERIA: NORMAL
BASOPHILS # BLD: 1 % (ref 0–2)
BASOPHILS ABSOLUTE: 0.07 K/UL (ref 0–0.2)
BILIRUB SERPL-MCNC: 1.14 MG/DL (ref 0.3–1.2)
BILIRUBIN URINE: NEGATIVE
BUN BLDV-MCNC: 17 MG/DL (ref 8–23)
BUN/CREAT BLD: 17 (ref 9–20)
CALCIUM SERPL-MCNC: 9.3 MG/DL (ref 8.6–10.4)
CASTS UA: NORMAL /LPF
CHLORIDE BLD-SCNC: 103 MMOL/L (ref 98–107)
CO2: 21 MMOL/L (ref 20–31)
COLOR: YELLOW
COMMENT UA: ABNORMAL
CREAT SERPL-MCNC: 1.02 MG/DL (ref 0.5–0.9)
CRYSTALS, UA: NORMAL /HPF
DIFFERENTIAL TYPE: ABNORMAL
EKG ATRIAL RATE: 79 BPM
EKG P AXIS: 60 DEGREES
EKG P-R INTERVAL: 144 MS
EKG Q-T INTERVAL: 380 MS
EKG QRS DURATION: 86 MS
EKG QTC CALCULATION (BAZETT): 435 MS
EKG R AXIS: -5 DEGREES
EKG T AXIS: 47 DEGREES
EKG VENTRICULAR RATE: 79 BPM
EOSINOPHILS RELATIVE PERCENT: 2 % (ref 1–4)
EPITHELIAL CELLS UA: NORMAL /HPF (ref 0–25)
GFR AFRICAN AMERICAN: >60 ML/MIN
GFR NON-AFRICAN AMERICAN: 54 ML/MIN
GFR SERPL CREATININE-BSD FRML MDRD: ABNORMAL ML/MIN/{1.73_M2}
GFR SERPL CREATININE-BSD FRML MDRD: ABNORMAL ML/MIN/{1.73_M2}
GLUCOSE BLD-MCNC: 151 MG/DL (ref 70–99)
GLUCOSE URINE: NEGATIVE
HCT VFR BLD CALC: 42.1 % (ref 36.3–47.1)
HEMOGLOBIN: 14.6 G/DL (ref 11.9–15.1)
IMMATURE GRANULOCYTES: 0 %
KETONES, URINE: ABNORMAL
LEUKOCYTE ESTERASE, URINE: NEGATIVE
LYMPHOCYTES # BLD: 14 % (ref 24–43)
MCH RBC QN AUTO: 30.5 PG (ref 25.2–33.5)
MCHC RBC AUTO-ENTMCNC: 34.7 G/DL (ref 28.4–34.8)
MCV RBC AUTO: 87.9 FL (ref 82.6–102.9)
MONOCYTES # BLD: 8 % (ref 3–12)
MUCUS: NORMAL
NITRITE, URINE: NEGATIVE
NRBC AUTOMATED: 0 PER 100 WBC
OTHER OBSERVATIONS UA: NORMAL
PDW BLD-RTO: 12.5 % (ref 11.8–14.4)
PH UA: 5.5 (ref 5–9)
PLATELET # BLD: 259 K/UL (ref 138–453)
PLATELET ESTIMATE: ABNORMAL
PMV BLD AUTO: 10.9 FL (ref 8.1–13.5)
POTASSIUM SERPL-SCNC: 4.2 MMOL/L (ref 3.7–5.3)
PROTEIN UA: ABNORMAL
RBC # BLD: 4.79 M/UL (ref 3.95–5.11)
RBC # BLD: ABNORMAL 10*6/UL
RBC UA: NORMAL /HPF (ref 0–2)
RENAL EPITHELIAL, UA: NORMAL /HPF
SEG NEUTROPHILS: 75 % (ref 36–65)
SEGMENTED NEUTROPHILS ABSOLUTE COUNT: 7.99 K/UL (ref 1.5–8.1)
SODIUM BLD-SCNC: 140 MMOL/L (ref 135–144)
SPECIFIC GRAVITY UA: >1.03 (ref 1.01–1.02)
TOTAL PROTEIN: 7.3 G/DL (ref 6.4–8.3)
TRICHOMONAS: NORMAL
TURBIDITY: CLEAR
URINE HGB: ABNORMAL
UROBILINOGEN, URINE: NORMAL
WBC # BLD: 10.7 K/UL (ref 3.5–11.3)
WBC # BLD: ABNORMAL 10*3/UL
WBC UA: NORMAL /HPF (ref 0–5)
YEAST: NORMAL

## 2018-06-26 PROCEDURE — 3017F COLORECTAL CA SCREEN DOC REV: CPT | Performed by: NURSE PRACTITIONER

## 2018-06-26 PROCEDURE — 1090F PRES/ABSN URINE INCON ASSESS: CPT | Performed by: NURSE PRACTITIONER

## 2018-06-26 PROCEDURE — 1123F ACP DISCUSS/DSCN MKR DOCD: CPT | Performed by: NURSE PRACTITIONER

## 2018-06-26 PROCEDURE — 36415 COLL VENOUS BLD VENIPUNCTURE: CPT

## 2018-06-26 PROCEDURE — 1036F TOBACCO NON-USER: CPT | Performed by: NURSE PRACTITIONER

## 2018-06-26 PROCEDURE — 85025 COMPLETE CBC W/AUTO DIFF WBC: CPT

## 2018-06-26 PROCEDURE — G8400 PT W/DXA NO RESULTS DOC: HCPCS | Performed by: NURSE PRACTITIONER

## 2018-06-26 PROCEDURE — 2580000003 HC RX 258: Performed by: EMERGENCY MEDICINE

## 2018-06-26 PROCEDURE — G8427 DOCREV CUR MEDS BY ELIG CLIN: HCPCS | Performed by: NURSE PRACTITIONER

## 2018-06-26 PROCEDURE — 96374 THER/PROPH/DIAG INJ IV PUSH: CPT

## 2018-06-26 PROCEDURE — 80053 COMPREHEN METABOLIC PANEL: CPT

## 2018-06-26 PROCEDURE — 6360000002 HC RX W HCPCS: Performed by: EMERGENCY MEDICINE

## 2018-06-26 PROCEDURE — 96375 TX/PRO/DX INJ NEW DRUG ADDON: CPT

## 2018-06-26 PROCEDURE — 4040F PNEUMOC VAC/ADMIN/RCVD: CPT | Performed by: NURSE PRACTITIONER

## 2018-06-26 PROCEDURE — 99214 OFFICE O/P EST MOD 30 MIN: CPT | Performed by: NURSE PRACTITIONER

## 2018-06-26 PROCEDURE — 93005 ELECTROCARDIOGRAM TRACING: CPT

## 2018-06-26 PROCEDURE — 99284 EMERGENCY DEPT VISIT MOD MDM: CPT

## 2018-06-26 PROCEDURE — 74176 CT ABD & PELVIS W/O CONTRAST: CPT

## 2018-06-26 PROCEDURE — 81001 URINALYSIS AUTO W/SCOPE: CPT

## 2018-06-26 PROCEDURE — G8417 CALC BMI ABV UP PARAM F/U: HCPCS | Performed by: NURSE PRACTITIONER

## 2018-06-26 RX ORDER — HYDROCODONE BITARTRATE AND ACETAMINOPHEN 5; 325 MG/1; MG/1
1 TABLET ORAL EVERY 6 HOURS PRN
Qty: 10 TABLET | Refills: 0 | Status: SHIPPED | OUTPATIENT
Start: 2018-06-26 | End: 2018-06-29

## 2018-06-26 RX ORDER — ONDANSETRON 4 MG/1
4 TABLET, ORALLY DISINTEGRATING ORAL EVERY 8 HOURS PRN
Qty: 20 TABLET | Refills: 0 | Status: SHIPPED | OUTPATIENT
Start: 2018-06-26 | End: 2018-10-26

## 2018-06-26 RX ORDER — KETOROLAC TROMETHAMINE 15 MG/ML
15 INJECTION, SOLUTION INTRAMUSCULAR; INTRAVENOUS ONCE
Status: COMPLETED | OUTPATIENT
Start: 2018-06-26 | End: 2018-06-26

## 2018-06-26 RX ORDER — MORPHINE SULFATE 4 MG/ML
4 INJECTION, SOLUTION INTRAMUSCULAR; INTRAVENOUS
Status: DISCONTINUED | OUTPATIENT
Start: 2018-06-26 | End: 2018-06-26 | Stop reason: HOSPADM

## 2018-06-26 RX ORDER — TAMSULOSIN HYDROCHLORIDE 0.4 MG/1
0.4 CAPSULE ORAL DAILY
Qty: 30 CAPSULE | Refills: 0 | Status: SHIPPED | OUTPATIENT
Start: 2018-06-26 | End: 2018-06-26 | Stop reason: SDUPTHER

## 2018-06-26 RX ORDER — TAMSULOSIN HYDROCHLORIDE 0.4 MG/1
0.4 CAPSULE ORAL DAILY
Qty: 30 CAPSULE | Refills: 0 | Status: SHIPPED | OUTPATIENT
Start: 2018-06-26 | End: 2018-10-26

## 2018-06-26 RX ORDER — IBUPROFEN 600 MG/1
600 TABLET ORAL EVERY 6 HOURS
Qty: 12 TABLET | Refills: 0 | Status: SHIPPED | OUTPATIENT
Start: 2018-06-26 | End: 2019-11-15 | Stop reason: ALTCHOICE

## 2018-06-26 RX ORDER — CIPROFLOXACIN 500 MG/1
500 TABLET, FILM COATED ORAL 2 TIMES DAILY
Qty: 20 TABLET | Refills: 0 | Status: SHIPPED | OUTPATIENT
Start: 2018-06-26 | End: 2018-07-06

## 2018-06-26 RX ORDER — 0.9 % SODIUM CHLORIDE 0.9 %
1000 INTRAVENOUS SOLUTION INTRAVENOUS ONCE
Status: COMPLETED | OUTPATIENT
Start: 2018-06-26 | End: 2018-06-26

## 2018-06-26 RX ORDER — ONDANSETRON 2 MG/ML
4 INJECTION INTRAMUSCULAR; INTRAVENOUS EVERY 30 MIN PRN
Status: DISCONTINUED | OUTPATIENT
Start: 2018-06-26 | End: 2018-06-26 | Stop reason: HOSPADM

## 2018-06-26 RX ORDER — CIPROFLOXACIN 500 MG/1
500 TABLET, FILM COATED ORAL 2 TIMES DAILY
Qty: 20 TABLET | Refills: 0 | Status: SHIPPED | OUTPATIENT
Start: 2018-06-26 | End: 2018-06-26 | Stop reason: SDUPTHER

## 2018-06-26 RX ORDER — DOCUSATE SODIUM 100 MG/1
100 CAPSULE, LIQUID FILLED ORAL 2 TIMES DAILY
Qty: 30 CAPSULE | Refills: 0 | Status: SHIPPED | OUTPATIENT
Start: 2018-06-26 | End: 2018-10-26

## 2018-06-26 RX ADMIN — MORPHINE SULFATE 4 MG: 4 INJECTION INTRAVENOUS at 06:13

## 2018-06-26 RX ADMIN — SODIUM CHLORIDE 1000 ML: 9 INJECTION, SOLUTION INTRAVENOUS at 06:10

## 2018-06-26 RX ADMIN — ONDANSETRON 4 MG: 2 INJECTION INTRAMUSCULAR; INTRAVENOUS at 06:10

## 2018-06-26 RX ADMIN — KETOROLAC TROMETHAMINE 15 MG: 15 INJECTION, SOLUTION INTRAMUSCULAR; INTRAVENOUS at 06:12

## 2018-06-26 ASSESSMENT — ENCOUNTER SYMPTOMS
CONSTIPATION: 0
EYE PAIN: 0
ABDOMINAL PAIN: 0
COUGH: 0
COLOR CHANGE: 0
BACK PAIN: 0
ABDOMINAL PAIN: 1
VOMITING: 0
WHEEZING: 0
SHORTNESS OF BREATH: 0
BACK PAIN: 0
NAUSEA: 1
SHORTNESS OF BREATH: 0
VOMITING: 1
DIARRHEA: 0
EYE REDNESS: 0
NAUSEA: 1
COUGH: 0
FACIAL SWELLING: 0

## 2018-06-26 ASSESSMENT — PAIN DESCRIPTION - LOCATION
LOCATION: ABDOMEN
LOCATION: ABDOMEN;FLANK

## 2018-06-26 ASSESSMENT — PAIN SCALES - GENERAL
PAINLEVEL_OUTOF10: 10
PAINLEVEL_OUTOF10: 2
PAINLEVEL_OUTOF10: 10
PAINLEVEL_OUTOF10: 3

## 2018-06-26 ASSESSMENT — PAIN DESCRIPTION - ORIENTATION
ORIENTATION: LEFT
ORIENTATION: LEFT

## 2018-06-26 ASSESSMENT — PAIN DESCRIPTION - PAIN TYPE: TYPE: ACUTE PAIN

## 2018-06-26 ASSESSMENT — PAIN DESCRIPTION - DESCRIPTORS: DESCRIPTORS: RADIATING;SHARP;STABBING

## 2018-06-28 ENCOUNTER — APPOINTMENT (OUTPATIENT)
Dept: GENERAL RADIOLOGY | Age: 63
End: 2018-06-28
Attending: UROLOGY
Payer: MEDICARE

## 2018-06-28 ENCOUNTER — ANESTHESIA (OUTPATIENT)
Dept: OPERATING ROOM | Age: 63
End: 2018-06-28
Payer: MEDICARE

## 2018-06-28 ENCOUNTER — ANESTHESIA EVENT (OUTPATIENT)
Dept: OPERATING ROOM | Age: 63
End: 2018-06-28
Payer: MEDICARE

## 2018-06-28 ENCOUNTER — HOSPITAL ENCOUNTER (OUTPATIENT)
Age: 63
Setting detail: OUTPATIENT SURGERY
Discharge: HOME | End: 2018-06-28
Attending: UROLOGY | Admitting: UROLOGY
Payer: MEDICARE

## 2018-06-28 VITALS
SYSTOLIC BLOOD PRESSURE: 136 MMHG | RESPIRATION RATE: 18 BRPM | HEIGHT: 65 IN | HEART RATE: 74 BPM | WEIGHT: 224 LBS | DIASTOLIC BLOOD PRESSURE: 69 MMHG | OXYGEN SATURATION: 97 % | TEMPERATURE: 96.5 F | BODY MASS INDEX: 37.32 KG/M2

## 2018-06-28 VITALS
OXYGEN SATURATION: 95 % | DIASTOLIC BLOOD PRESSURE: 44 MMHG | RESPIRATION RATE: 8 BRPM | SYSTOLIC BLOOD PRESSURE: 102 MMHG

## 2018-06-28 PROCEDURE — C1758 CATHETER, URETERAL: HCPCS | Performed by: UROLOGY

## 2018-06-28 PROCEDURE — 6370000000 HC RX 637 (ALT 250 FOR IP): Performed by: UROLOGY

## 2018-06-28 PROCEDURE — 76000 FLUOROSCOPY <1 HR PHYS/QHP: CPT

## 2018-06-28 PROCEDURE — 3700000001 HC ADD 15 MINUTES (ANESTHESIA): Performed by: UROLOGY

## 2018-06-28 PROCEDURE — 6360000002 HC RX W HCPCS: Performed by: NURSE ANESTHETIST, CERTIFIED REGISTERED

## 2018-06-28 PROCEDURE — 2500000003 HC RX 250 WO HCPCS: Performed by: NURSE ANESTHETIST, CERTIFIED REGISTERED

## 2018-06-28 PROCEDURE — 2580000003 HC RX 258: Performed by: UROLOGY

## 2018-06-28 PROCEDURE — 6370000000 HC RX 637 (ALT 250 FOR IP): Performed by: NURSE ANESTHETIST, CERTIFIED REGISTERED

## 2018-06-28 PROCEDURE — 7100000010 HC PHASE II RECOVERY - FIRST 15 MIN: Performed by: UROLOGY

## 2018-06-28 PROCEDURE — 3600000005 HC SURGERY LEVEL 5 BASE: Performed by: UROLOGY

## 2018-06-28 PROCEDURE — 3600000015 HC SURGERY LEVEL 5 ADDTL 15MIN: Performed by: UROLOGY

## 2018-06-28 PROCEDURE — C1769 GUIDE WIRE: HCPCS | Performed by: UROLOGY

## 2018-06-28 PROCEDURE — 6360000002 HC RX W HCPCS: Performed by: UROLOGY

## 2018-06-28 PROCEDURE — 3700000000 HC ANESTHESIA ATTENDED CARE: Performed by: UROLOGY

## 2018-06-28 PROCEDURE — 7100000011 HC PHASE II RECOVERY - ADDTL 15 MIN: Performed by: UROLOGY

## 2018-06-28 PROCEDURE — C2617 STENT, NON-COR, TEM W/O DEL: HCPCS | Performed by: UROLOGY

## 2018-06-28 DEVICE — URETERAL STENT
Type: IMPLANTABLE DEVICE | Site: URETER | Status: FUNCTIONAL
Brand: PERCUFLEX™ PLUS

## 2018-06-28 RX ORDER — ONDANSETRON 2 MG/ML
INJECTION INTRAMUSCULAR; INTRAVENOUS PRN
Status: DISCONTINUED | OUTPATIENT
Start: 2018-06-28 | End: 2018-06-28 | Stop reason: SDUPTHER

## 2018-06-28 RX ORDER — SODIUM CHLORIDE, SODIUM LACTATE, POTASSIUM CHLORIDE, CALCIUM CHLORIDE 600; 310; 30; 20 MG/100ML; MG/100ML; MG/100ML; MG/100ML
INJECTION, SOLUTION INTRAVENOUS CONTINUOUS
Status: DISCONTINUED | OUTPATIENT
Start: 2018-06-28 | End: 2018-06-28

## 2018-06-28 RX ORDER — SODIUM CHLORIDE 0.9 % (FLUSH) 0.9 %
10 SYRINGE (ML) INJECTION PRN
Status: DISCONTINUED | OUTPATIENT
Start: 2018-06-28 | End: 2018-06-28 | Stop reason: HOSPADM

## 2018-06-28 RX ORDER — METOCLOPRAMIDE HYDROCHLORIDE 5 MG/ML
INJECTION INTRAMUSCULAR; INTRAVENOUS PRN
Status: DISCONTINUED | OUTPATIENT
Start: 2018-06-28 | End: 2018-06-28 | Stop reason: SDUPTHER

## 2018-06-28 RX ORDER — MIDAZOLAM HYDROCHLORIDE 1 MG/ML
INJECTION INTRAMUSCULAR; INTRAVENOUS PRN
Status: DISCONTINUED | OUTPATIENT
Start: 2018-06-28 | End: 2018-06-28 | Stop reason: SDUPTHER

## 2018-06-28 RX ORDER — SODIUM CHLORIDE 0.9 % (FLUSH) 0.9 %
10 SYRINGE (ML) INJECTION EVERY 12 HOURS SCHEDULED
Status: DISCONTINUED | OUTPATIENT
Start: 2018-06-28 | End: 2018-06-28 | Stop reason: HOSPADM

## 2018-06-28 RX ORDER — FENTANYL CITRATE 50 UG/ML
INJECTION, SOLUTION INTRAMUSCULAR; INTRAVENOUS PRN
Status: DISCONTINUED | OUTPATIENT
Start: 2018-06-28 | End: 2018-06-28 | Stop reason: SDUPTHER

## 2018-06-28 RX ORDER — SODIUM CHLORIDE, SODIUM LACTATE, POTASSIUM CHLORIDE, CALCIUM CHLORIDE 600; 310; 30; 20 MG/100ML; MG/100ML; MG/100ML; MG/100ML
INJECTION, SOLUTION INTRAVENOUS CONTINUOUS
Status: DISCONTINUED | OUTPATIENT
Start: 2018-06-28 | End: 2018-06-28 | Stop reason: HOSPADM

## 2018-06-28 RX ORDER — DEXAMETHASONE SODIUM PHOSPHATE 10 MG/ML
INJECTION INTRAMUSCULAR; INTRAVENOUS PRN
Status: DISCONTINUED | OUTPATIENT
Start: 2018-06-28 | End: 2018-06-28 | Stop reason: SDUPTHER

## 2018-06-28 RX ORDER — CIPROFLOXACIN 2 MG/ML
400 INJECTION, SOLUTION INTRAVENOUS
Status: COMPLETED | OUTPATIENT
Start: 2018-06-28 | End: 2018-06-28

## 2018-06-28 RX ORDER — PROPOFOL 10 MG/ML
INJECTION, EMULSION INTRAVENOUS PRN
Status: DISCONTINUED | OUTPATIENT
Start: 2018-06-28 | End: 2018-06-28 | Stop reason: SDUPTHER

## 2018-06-28 RX ORDER — EPHEDRINE SULFATE/0.9% NACL/PF 50 MG/5 ML
SYRINGE (ML) INTRAVENOUS PRN
Status: DISCONTINUED | OUTPATIENT
Start: 2018-06-28 | End: 2018-06-28 | Stop reason: SDUPTHER

## 2018-06-28 RX ADMIN — PROPOFOL 100 MG: 10 INJECTION, EMULSION INTRAVENOUS at 11:20

## 2018-06-28 RX ADMIN — SODIUM CHLORIDE, POTASSIUM CHLORIDE, SODIUM LACTATE AND CALCIUM CHLORIDE: 600; 310; 30; 20 INJECTION, SOLUTION INTRAVENOUS at 08:52

## 2018-06-28 RX ADMIN — DEXAMETHASONE SODIUM PHOSPHATE 8 MG: 10 INJECTION INTRAMUSCULAR; INTRAVENOUS at 10:55

## 2018-06-28 RX ADMIN — MIDAZOLAM 2 MG: 1 INJECTION INTRAMUSCULAR; INTRAVENOUS at 10:45

## 2018-06-28 RX ADMIN — LIDOCAINE HYDROCHLORIDE 50 MG: 20 JELLY TOPICAL at 10:45

## 2018-06-28 RX ADMIN — Medication 10 MG: at 10:54

## 2018-06-28 RX ADMIN — CIPROFLOXACIN 400 MG: 2 INJECTION, SOLUTION INTRAVENOUS at 10:15

## 2018-06-28 RX ADMIN — METOCLOPRAMIDE 10 MG: 5 INJECTION, SOLUTION INTRAMUSCULAR; INTRAVENOUS at 10:55

## 2018-06-28 RX ADMIN — ONDANSETRON 4 MG: 2 INJECTION INTRAMUSCULAR; INTRAVENOUS at 10:55

## 2018-06-28 RX ADMIN — Medication 10 MG: at 11:00

## 2018-06-28 RX ADMIN — FENTANYL CITRATE 100 MCG: 50 INJECTION, SOLUTION INTRAMUSCULAR; INTRAVENOUS at 10:45

## 2018-06-28 RX ADMIN — SODIUM CHLORIDE, POTASSIUM CHLORIDE, SODIUM LACTATE AND CALCIUM CHLORIDE: 600; 310; 30; 20 INJECTION, SOLUTION INTRAVENOUS at 11:18

## 2018-06-28 RX ADMIN — PROPOFOL 160 MG: 10 INJECTION, EMULSION INTRAVENOUS at 10:50

## 2018-06-28 ASSESSMENT — PAIN SCALES - GENERAL
PAINLEVEL_OUTOF10: 0

## 2018-06-29 ENCOUNTER — TELEPHONE (OUTPATIENT)
Dept: UROLOGY | Age: 63
End: 2018-06-29

## 2018-08-21 ENCOUNTER — HOSPITAL ENCOUNTER (OUTPATIENT)
Age: 63
Discharge: HOME | End: 2018-08-23
Payer: MEDICARE

## 2018-08-21 ENCOUNTER — HOSPITAL ENCOUNTER (OUTPATIENT)
Age: 63
Discharge: HOME | End: 2018-08-21
Payer: MEDICARE

## 2018-08-21 ENCOUNTER — HOSPITAL ENCOUNTER (OUTPATIENT)
Dept: GENERAL RADIOLOGY | Age: 63
Discharge: HOME | End: 2018-08-23
Payer: MEDICARE

## 2018-08-21 DIAGNOSIS — R31.9 HEMATURIA: ICD-10-CM

## 2018-08-21 DIAGNOSIS — N13.2 URETERAL STONE WITH HYDRONEPHROSIS: ICD-10-CM

## 2018-08-21 DIAGNOSIS — N20.0 RENAL STONES: ICD-10-CM

## 2018-08-21 PROCEDURE — 87086 URINE CULTURE/COLONY COUNT: CPT

## 2018-08-21 PROCEDURE — 74018 RADEX ABDOMEN 1 VIEW: CPT

## 2018-08-22 ENCOUNTER — TELEPHONE (OUTPATIENT)
Dept: UROLOGY | Age: 63
End: 2018-08-22

## 2018-08-22 ENCOUNTER — OFFICE VISIT (OUTPATIENT)
Dept: UROLOGY | Age: 63
End: 2018-08-22
Payer: MEDICARE

## 2018-08-22 VITALS
SYSTOLIC BLOOD PRESSURE: 124 MMHG | BODY MASS INDEX: 35.68 KG/M2 | HEIGHT: 66 IN | WEIGHT: 222 LBS | DIASTOLIC BLOOD PRESSURE: 86 MMHG

## 2018-08-22 DIAGNOSIS — N20.0 KIDNEY STONES: Primary | Chronic | ICD-10-CM

## 2018-08-22 LAB
CULTURE: NO GROWTH
Lab: NORMAL
SPECIMEN DESCRIPTION: NORMAL
STATUS: NORMAL

## 2018-08-22 PROCEDURE — 1036F TOBACCO NON-USER: CPT | Performed by: UROLOGY

## 2018-08-22 PROCEDURE — G8427 DOCREV CUR MEDS BY ELIG CLIN: HCPCS | Performed by: UROLOGY

## 2018-08-22 PROCEDURE — G8417 CALC BMI ABV UP PARAM F/U: HCPCS | Performed by: UROLOGY

## 2018-08-22 PROCEDURE — 99213 OFFICE O/P EST LOW 20 MIN: CPT | Performed by: UROLOGY

## 2018-08-22 PROCEDURE — 4040F PNEUMOC VAC/ADMIN/RCVD: CPT | Performed by: UROLOGY

## 2018-08-22 PROCEDURE — G8400 PT W/DXA NO RESULTS DOC: HCPCS | Performed by: UROLOGY

## 2018-08-22 PROCEDURE — 1101F PT FALLS ASSESS-DOCD LE1/YR: CPT | Performed by: UROLOGY

## 2018-08-22 PROCEDURE — 3017F COLORECTAL CA SCREEN DOC REV: CPT | Performed by: UROLOGY

## 2018-08-22 PROCEDURE — 1123F ACP DISCUSS/DSCN MKR DOCD: CPT | Performed by: UROLOGY

## 2018-08-22 PROCEDURE — 1090F PRES/ABSN URINE INCON ASSESS: CPT | Performed by: UROLOGY

## 2018-08-22 ASSESSMENT — ENCOUNTER SYMPTOMS
SHORTNESS OF BREATH: 0
WHEEZING: 0
EYE REDNESS: 0
COLOR CHANGE: 0
ABDOMINAL PAIN: 0
EYE PAIN: 0
COUGH: 0
VOMITING: 0
BACK PAIN: 0
NAUSEA: 0

## 2018-08-22 NOTE — TELEPHONE ENCOUNTER
----- Message from Mars Pradhan, APRN - CNP sent at 8/22/2018  2:05 PM EDT -----  Call pt - urine cx reviewed and negative for UTI

## 2018-08-22 NOTE — PROGRESS NOTES
Subjective:      Patient ID: Latoya Sheriff is a 70 y.o. female. HPI  Patient is a 70 y.o. female in no acute distress. She is alert and oriented to person, place, and time. History  Previous patient of Dr. Arnaldo Chaudhary.     3/2009 left ESWL for 16x8mm and 6x6mm stones. 100% uric acid stones. 7/2012 on urocit-k 15 mEq BID and allopurinol 300mg daily     3/2017 Referred by PCP due to microhematuria, UA showed 20-50 RBCs, it also showed uric acid crystals. Denies any gross hematuria. CT urogram showed a 25 x 20 mm stone in the lower pole the left kidney.       5/2/2017 and 5/9/2017 staged left ESWL    6/28/2018 left HLL    Currently  Patient is here today 6 weeks status post left holmium laser lithotripsy. Patient is doing well. She has no recent gross hematuria dysuria. She reports no flank pain nausea or vomiting. Patient to get a KUB prior to today's visit. This was independently reviewed today. there are no stones able to be visualized on the KUB. patient reports no pain today. She's had no lower urinary tract symptoms.     Past Medical History:   Diagnosis Date    Allergic rhinitis     Colon polyp     Hyperlipidemia     Impaired fasting glucose     Internal hemorrhoids     Kidney stones     Lump or mass in breast     right     Past Surgical History:   Procedure Laterality Date    BREAST BIOPSY Right 6/7/2013    [facility] in [place] - benign    BREAST SURGERY  2000    Right /cyst drained    COLONOSCOPY  8/09    COLONOSCOPY  2/6/2013    colon polyps    CYSTOSCOPY Left 5/2/2017    CYSTOSCOPY URETERAL STENT INSERTION performed by Ricardo Godoy MD at [address]  4/14/2009,3/20/2009,3/18/2009    LITHOTRIPSY  3/20/2009    LITHOTRIPSY Left 5/2/2017    ESWL EXTRACORPEAL SHOCK WAVE LITHOTRIPSY performed by Ricardo Godoy MD at [address] LITHOTRIPSY Left 05/09/2017    per Dr. Sanjay Burroughs LITHOTRIPSY Left 5/9/2017    ESWL [address] LITHOTRIPSY performed by Clay Braun Peyton Briggs MD at [address] OFFICE/OUTPT VISIT,PROCEDURE ONLY Left 6/28/2018    LEFT CYSTOSCOPY URETEROSCOPY LASER - HLL POSSIBLE STENT PLACEMENT performed by Khai Crews MD at [address] [place] [address] PRE-MALIGNANT / BENIGN SKIN LESION EXCISION  7/09    posterior trunk    TONSILLECTOMY AND ADENOIDECTOMY       Family History   Problem Relation Age of Onset    Cancer Sister         breast    Diabetes Sister     Stroke Mother     Diabetes Mother     Stroke Father     Diabetes Father      Current Outpatient Prescriptions on File Prior to Visit   Medication Sig Dispense Refill    ondansetron (ZOFRAN ODT) 4 MG disintegrating tablet Take 1 tablet by mouth every 8 hours as needed for Nausea 20 tablet 0    tamsulosin (FLOMAX) 0.4 MG capsule Take 1 capsule by mouth daily Take to facilitate stone passage 30 capsule 0    docusate sodium (COLACE) 100 MG capsule Take 1 capsule by mouth 2 times daily 30 capsule 0    atorvastatin (LIPITOR) 20 MG tablet Take 1 tablet by mouth daily 90 tablet 3    Vitamin D (CHOLECALCIFEROL) 1000 UNITS CAPS capsule Take 1,000 Units by mouth daily.  Multiple Vitamins-Minerals (MULTIVITAMIN PO) Take  by mouth.  Cetirizine HCl (ZYRTEC PO) Take 1 tablet by mouth daily.  ibuprofen (ADVIL;MOTRIN) 600 MG tablet Take 1 tablet by mouth every 6 hours for 12 doses Take with food. 12 tablet 0     No current facility-administered medications on file prior to visit.        Outpatient Encounter Prescriptions as of 8/22/2018   Medication Sig Dispense Refill    ondansetron (ZOFRAN ODT) 4 MG disintegrating tablet Take 1 tablet by mouth every 8 hours as needed for Nausea 20 tablet 0    tamsulosin (FLOMAX) 0.4 MG capsule Take 1 capsule by mouth daily Take to facilitate stone passage 30 capsule 0    docusate sodium (COLACE) 100 MG capsule Take 1 capsule by mouth 2 times daily 30 capsule 0    atorvastatin (LIPITOR) 20 MG tablet Take 1 tablet by mouth daily 90 tablet 3    Vitamin D

## 2018-08-23 ENCOUNTER — TELEPHONE (OUTPATIENT)
Dept: UROLOGY | Age: 63
End: 2018-08-23

## 2018-10-11 ENCOUNTER — HOSPITAL ENCOUNTER (OUTPATIENT)
Age: 63
Discharge: HOME | End: 2018-10-11
Payer: MEDICARE

## 2018-10-11 DIAGNOSIS — R73.01 IMPAIRED FASTING GLUCOSE: ICD-10-CM

## 2018-10-11 DIAGNOSIS — E78.2 MIXED HYPERLIPIDEMIA: ICD-10-CM

## 2018-10-11 LAB
ALT SERPL-CCNC: 60 U/L (ref 5–33)
ANION GAP SERPL CALCULATED.3IONS-SCNC: 11 MMOL/L (ref 9–17)
AST SERPL-CCNC: 33 U/L
BUN BLDV-MCNC: 18 MG/DL (ref 8–23)
BUN/CREAT BLD: 25 (ref 9–20)
CALCIUM SERPL-MCNC: 9.2 MG/DL (ref 8.6–10.4)
CHLORIDE BLD-SCNC: 108 MMOL/L (ref 98–107)
CHOLESTEROL/HDL RATIO: 3.6
CHOLESTEROL: 149 MG/DL
CO2: 25 MMOL/L (ref 20–31)
CREAT SERPL-MCNC: 0.72 MG/DL (ref 0.5–0.9)
ESTIMATED AVERAGE GLUCOSE: 126 MG/DL
GFR AFRICAN AMERICAN: >60 ML/MIN
GFR NON-AFRICAN AMERICAN: >60 ML/MIN
GFR SERPL CREATININE-BSD FRML MDRD: ABNORMAL ML/MIN/{1.73_M2}
GFR SERPL CREATININE-BSD FRML MDRD: ABNORMAL ML/MIN/{1.73_M2}
GLUCOSE BLD-MCNC: 128 MG/DL (ref 70–99)
HBA1C MFR BLD: 6 % (ref 4.8–5.9)
HCT VFR BLD CALC: 43 % (ref 36.3–47.1)
HDLC SERPL-MCNC: 41 MG/DL
HEMOGLOBIN: 13.7 G/DL (ref 11.9–15.1)
LDL CHOLESTEROL: 85 MG/DL (ref 0–130)
MCH RBC QN AUTO: 30.2 PG (ref 25.2–33.5)
MCHC RBC AUTO-ENTMCNC: 31.9 G/DL (ref 28.4–34.8)
MCV RBC AUTO: 94.9 FL (ref 82.6–102.9)
NRBC AUTOMATED: 0 PER 100 WBC
PDW BLD-RTO: 13.5 % (ref 11.8–14.4)
PLATELET # BLD: 260 K/UL (ref 138–453)
PMV BLD AUTO: 10.7 FL (ref 8.1–13.5)
POTASSIUM SERPL-SCNC: 4.3 MMOL/L (ref 3.7–5.3)
RBC # BLD: 4.53 M/UL (ref 3.95–5.11)
SODIUM BLD-SCNC: 144 MMOL/L (ref 135–144)
TRIGL SERPL-MCNC: 116 MG/DL
VLDLC SERPL CALC-MCNC: NORMAL MG/DL (ref 1–30)
WBC # BLD: 6.8 K/UL (ref 3.5–11.3)

## 2018-10-11 PROCEDURE — 84460 ALANINE AMINO (ALT) (SGPT): CPT

## 2018-10-11 PROCEDURE — 36415 COLL VENOUS BLD VENIPUNCTURE: CPT

## 2018-10-11 PROCEDURE — 80061 LIPID PANEL: CPT

## 2018-10-11 PROCEDURE — 85027 COMPLETE CBC AUTOMATED: CPT

## 2018-10-11 PROCEDURE — 80048 BASIC METABOLIC PNL TOTAL CA: CPT

## 2018-10-11 PROCEDURE — 83036 HEMOGLOBIN GLYCOSYLATED A1C: CPT

## 2018-10-11 PROCEDURE — 84450 TRANSFERASE (AST) (SGOT): CPT

## 2019-05-03 ENCOUNTER — HOSPITAL ENCOUNTER (OUTPATIENT)
Age: 64
Discharge: HOME | End: 2019-05-03
Payer: MEDICARE

## 2019-05-03 DIAGNOSIS — E78.2 MIXED HYPERLIPIDEMIA: ICD-10-CM

## 2019-05-03 DIAGNOSIS — R73.01 IMPAIRED FASTING GLUCOSE: ICD-10-CM

## 2019-05-03 LAB
ALT SERPL-CCNC: 66 U/L (ref 5–33)
ANION GAP SERPL CALCULATED.3IONS-SCNC: 10 MMOL/L (ref 9–17)
AST SERPL-CCNC: 36 U/L
BUN BLDV-MCNC: 16 MG/DL (ref 8–23)
BUN/CREAT BLD: 27 (ref 9–20)
CALCIUM SERPL-MCNC: 8.7 MG/DL (ref 8.6–10.4)
CHLORIDE BLD-SCNC: 105 MMOL/L (ref 98–107)
CHOLESTEROL/HDL RATIO: 4.5
CHOLESTEROL: 199 MG/DL
CO2: 25 MMOL/L (ref 20–31)
CREAT SERPL-MCNC: 0.6 MG/DL (ref 0.5–0.9)
ESTIMATED AVERAGE GLUCOSE: 123 MG/DL
GFR AFRICAN AMERICAN: >60 ML/MIN
GFR NON-AFRICAN AMERICAN: >60 ML/MIN
GFR SERPL CREATININE-BSD FRML MDRD: ABNORMAL ML/MIN/{1.73_M2}
GFR SERPL CREATININE-BSD FRML MDRD: ABNORMAL ML/MIN/{1.73_M2}
GLUCOSE BLD-MCNC: 134 MG/DL (ref 70–99)
HBA1C MFR BLD: 5.9 % (ref 4.8–5.9)
HCT VFR BLD CALC: 42.6 % (ref 36.3–47.1)
HDLC SERPL-MCNC: 44 MG/DL
HEMOGLOBIN: 14.2 G/DL (ref 11.9–15.1)
LDL CHOLESTEROL: 122 MG/DL (ref 0–130)
MCH RBC QN AUTO: 30.5 PG (ref 25.2–33.5)
MCHC RBC AUTO-ENTMCNC: 33.3 G/DL (ref 28.4–34.8)
MCV RBC AUTO: 91.6 FL (ref 82.6–102.9)
NRBC AUTOMATED: 0 PER 100 WBC
PDW BLD-RTO: 12.8 % (ref 11.8–14.4)
PLATELET # BLD: 260 K/UL (ref 138–453)
PMV BLD AUTO: 11.2 FL (ref 8.1–13.5)
POTASSIUM SERPL-SCNC: 4.1 MMOL/L (ref 3.7–5.3)
RBC # BLD: 4.65 M/UL (ref 3.95–5.11)
SODIUM BLD-SCNC: 140 MMOL/L (ref 135–144)
TRIGL SERPL-MCNC: 166 MG/DL
VLDLC SERPL CALC-MCNC: ABNORMAL MG/DL (ref 1–30)
WBC # BLD: 6.7 K/UL (ref 3.5–11.3)

## 2019-05-03 PROCEDURE — 83036 HEMOGLOBIN GLYCOSYLATED A1C: CPT

## 2019-05-03 PROCEDURE — 84450 TRANSFERASE (AST) (SGOT): CPT

## 2019-05-03 PROCEDURE — 80061 LIPID PANEL: CPT

## 2019-05-03 PROCEDURE — 84460 ALANINE AMINO (ALT) (SGPT): CPT

## 2019-05-03 PROCEDURE — 85027 COMPLETE CBC AUTOMATED: CPT

## 2019-05-03 PROCEDURE — 80048 BASIC METABOLIC PNL TOTAL CA: CPT

## 2019-05-03 PROCEDURE — 36415 COLL VENOUS BLD VENIPUNCTURE: CPT

## 2019-08-13 ENCOUNTER — HOSPITAL ENCOUNTER (OUTPATIENT)
Dept: GENERAL RADIOLOGY | Age: 64
Discharge: HOME | End: 2019-08-15
Payer: MEDICARE

## 2019-08-13 ENCOUNTER — HOSPITAL ENCOUNTER (OUTPATIENT)
Age: 64
Discharge: HOME | End: 2019-08-15
Payer: MEDICARE

## 2019-08-13 DIAGNOSIS — N20.0 KIDNEY STONES: Chronic | ICD-10-CM

## 2019-08-13 PROCEDURE — 74018 RADEX ABDOMEN 1 VIEW: CPT

## 2019-08-14 ENCOUNTER — OFFICE VISIT (OUTPATIENT)
Dept: UROLOGY | Age: 64
End: 2019-08-14
Payer: MEDICARE

## 2019-08-14 ENCOUNTER — HOSPITAL ENCOUNTER (OUTPATIENT)
Age: 64
Setting detail: SPECIMEN
Discharge: HOME | End: 2019-08-14
Payer: MEDICARE

## 2019-08-14 VITALS — BODY MASS INDEX: 35.73 KG/M2 | WEIGHT: 218 LBS | SYSTOLIC BLOOD PRESSURE: 124 MMHG | DIASTOLIC BLOOD PRESSURE: 80 MMHG

## 2019-08-14 DIAGNOSIS — N20.0 KIDNEY STONES: ICD-10-CM

## 2019-08-14 DIAGNOSIS — N20.0 KIDNEY STONES: Primary | ICD-10-CM

## 2019-08-14 LAB
-: ABNORMAL
AMORPHOUS: ABNORMAL
BACTERIA: ABNORMAL
BILIRUBIN URINE: NEGATIVE
CASTS UA: ABNORMAL /LPF
COLOR: YELLOW
COMMENT UA: ABNORMAL
CRYSTALS, UA: ABNORMAL /HPF
CRYSTALS, UA: ABNORMAL /HPF
EPITHELIAL CELLS UA: ABNORMAL /HPF (ref 0–25)
GLUCOSE URINE: NEGATIVE
KETONES, URINE: NEGATIVE
LEUKOCYTE ESTERASE, URINE: NEGATIVE
MUCUS: ABNORMAL
NITRITE, URINE: NEGATIVE
OTHER OBSERVATIONS UA: ABNORMAL
PH UA: 5.5 (ref 5–9)
PROTEIN UA: NEGATIVE
RBC UA: ABNORMAL /HPF (ref 0–2)
RENAL EPITHELIAL, UA: ABNORMAL /HPF
SPECIFIC GRAVITY UA: 1.02 (ref 1.01–1.02)
TRICHOMONAS: ABNORMAL
TURBIDITY: CLEAR
URINE HGB: ABNORMAL
UROBILINOGEN, URINE: NORMAL
WBC UA: ABNORMAL /HPF (ref 0–5)
YEAST: ABNORMAL

## 2019-08-14 PROCEDURE — 87086 URINE CULTURE/COLONY COUNT: CPT

## 2019-08-14 PROCEDURE — 3017F COLORECTAL CA SCREEN DOC REV: CPT | Performed by: NURSE PRACTITIONER

## 2019-08-14 PROCEDURE — 82365 CALCULUS SPECTROSCOPY: CPT

## 2019-08-14 PROCEDURE — 1090F PRES/ABSN URINE INCON ASSESS: CPT | Performed by: NURSE PRACTITIONER

## 2019-08-14 PROCEDURE — 4040F PNEUMOC VAC/ADMIN/RCVD: CPT | Performed by: NURSE PRACTITIONER

## 2019-08-14 PROCEDURE — 99213 OFFICE O/P EST LOW 20 MIN: CPT | Performed by: NURSE PRACTITIONER

## 2019-08-14 PROCEDURE — 1036F TOBACCO NON-USER: CPT | Performed by: NURSE PRACTITIONER

## 2019-08-14 PROCEDURE — 1123F ACP DISCUSS/DSCN MKR DOCD: CPT | Performed by: NURSE PRACTITIONER

## 2019-08-14 PROCEDURE — 81001 URINALYSIS AUTO W/SCOPE: CPT

## 2019-08-14 PROCEDURE — G8400 PT W/DXA NO RESULTS DOC: HCPCS | Performed by: NURSE PRACTITIONER

## 2019-08-14 PROCEDURE — G8417 CALC BMI ABV UP PARAM F/U: HCPCS | Performed by: NURSE PRACTITIONER

## 2019-08-14 PROCEDURE — G8427 DOCREV CUR MEDS BY ELIG CLIN: HCPCS | Performed by: NURSE PRACTITIONER

## 2019-08-14 RX ORDER — TAMSULOSIN HYDROCHLORIDE 0.4 MG/1
0.4 CAPSULE ORAL DAILY
Qty: 30 CAPSULE | Refills: 0 | Status: SHIPPED | OUTPATIENT
Start: 2019-08-14 | End: 2019-11-15 | Stop reason: ALTCHOICE

## 2019-08-14 ASSESSMENT — ENCOUNTER SYMPTOMS
BACK PAIN: 0
COUGH: 0
WHEEZING: 0
CONSTIPATION: 0
SHORTNESS OF BREATH: 0
ABDOMINAL PAIN: 0
VOMITING: 0
COLOR CHANGE: 0
EYE REDNESS: 0
EYE PAIN: 0
NAUSEA: 0

## 2019-08-14 NOTE — PROGRESS NOTES
HPI:    Patient is a 75 y.o. female in no acute distress. She is alert and oriented to person, place, and time. History  Previous patient of Dr. Nuno Pascual.     3/2009 left ESWL for 16x8mm and 6x6mm stones. 100% uric acid stones. 7/2012 on urocit-k 15 mEq BID and allopurinol 300mg daily     3/2017 Referred by PCP due to microhematuria, UA showed 20-50 RBCs, it also showed uric acid crystals. Denies any gross hematuria. CT urogram showed a 25 x 20 mm stone in the lower pole the left kidney.       5/2/2017 and 5/9/2017 staged left ESWL    6/2018 left HLL    Today  Here today for an annual follow-up for history of stones. She denies any flank or abdominal pain, dysuria or gross hematuria. She did spontaneously pass a stone in July with minimal complaints. She did bring this stone in for her today. It is apx 7mm. She did have a KUB completed prior to today's visit. This film was independently reviewed and shows a questionable left renal stones. There is nothing on the right.      Past Medical History:   Diagnosis Date    Allergic rhinitis     Colon polyp     Hyperlipidemia     Impaired fasting glucose     Internal hemorrhoids     Kidney stones     Lump or mass in breast     right     Past Surgical History:   Procedure Laterality Date    BREAST [facility]OPSY [facility] 6/7/2013    [facility] - benign    BREAST SURGERY  2000    Right /cyst drained    COLONOSCOPY  8/09    COLONOSCOPY  2/6/2013    colon polyps    CYSTOSCOPY Left 5/2/2017    CYSTOSCOPY URETERAL STENT INSERTION performed by Aida Jung MD at [place] [address]  4/14/2009,3/20/2009,3/18/2009    LITHOTRIPSY  3/20/2009    LITHOTRIPSY Left 5/2/2017    ESWL EXTRACORPEAL SHOCK WAVE LITHOTRIPSY performed by Aida Jung MD at [facility] LITHOTRIPSY Left 05/09/2017    per Dr. Alisha Rodríguez LITHOTRIPSY Left 5/9/2017    ESWL EXTRACORPEAL SHOCK WAVE LITHOTRIPSY performed by Aida Jung MD at [address] [facility] OFFICE/OUTPT meats, pork). Limit protein intake to low-fat/lean options (low-fat dairy, fish, chicken, turkey)    We will see her in one year with a KUB prior or sooner if needed.

## 2019-08-15 LAB
CULTURE: NORMAL
Lab: NORMAL
SPECIMEN DESCRIPTION: NORMAL

## 2019-08-17 LAB
STONE COMPOSITION: NORMAL
STONE DESCRIPTION: NORMAL
STONE MASS: 141 MG
STONE NUMBER: 1
STONE SIZE: > 9 MM

## 2019-08-21 ENCOUNTER — TELEPHONE (OUTPATIENT)
Dept: UROLOGY | Age: 64
End: 2019-08-21

## 2019-08-21 DIAGNOSIS — N20.0 KIDNEY STONES: Primary | ICD-10-CM

## 2019-08-21 RX ORDER — POTASSIUM CITRATE 15 MEQ/1
15 TABLET, EXTENDED RELEASE ORAL 2 TIMES DAILY
Qty: 180 TABLET | Refills: 1 | Status: SHIPPED | OUTPATIENT
Start: 2019-08-21

## 2019-08-21 NOTE — TELEPHONE ENCOUNTER
Rx sent for potassium citrate one tablet twice per day. She will need renal labs in 6 months, but does not need to be seen. We will call with results.

## 2019-08-21 NOTE — TELEPHONE ENCOUNTER
Contacted the patient and advised her of the recent stone analysis results. Patient mentioned she would be interested in resuming the Urocit -K. Patient mentioned she would like the prescription sent to [facility] pharmacy.

## 2019-08-27 NOTE — TELEPHONE ENCOUNTER
Contacted the patient and advised her of the recent phone message concerning the potassium citrate medication and her upcoming renal labs to be performed in 6 months. Patient voiced understanding.

## 2019-12-10 ENCOUNTER — HOSPITAL ENCOUNTER (OUTPATIENT)
Dept: WOMENS IMAGING | Age: 64
Discharge: HOME | End: 2019-12-12
Payer: MEDICARE

## 2019-12-10 DIAGNOSIS — Z12.39 SCREENING FOR BREAST CANCER: ICD-10-CM

## 2019-12-10 PROCEDURE — 77063 BREAST TOMOSYNTHESIS BI: CPT

## 2020-07-29 ENCOUNTER — HOSPITAL ENCOUNTER (OUTPATIENT)
Age: 65
Discharge: HOME | End: 2020-07-29
Payer: MEDICARE

## 2020-07-29 LAB
ABSOLUTE EOS #: 0.22 K/UL (ref 0–0.44)
ABSOLUTE IMMATURE GRANULOCYTE: 0.04 K/UL (ref 0–0.3)
ABSOLUTE LYMPH #: 2.01 K/UL (ref 1.1–3.7)
ABSOLUTE MONO #: 0.68 K/UL (ref 0.1–1.2)
ANION GAP SERPL CALCULATED.3IONS-SCNC: 9 MMOL/L (ref 9–17)
BASOPHILS # BLD: 1 % (ref 0–2)
BASOPHILS ABSOLUTE: 0.07 K/UL (ref 0–0.2)
BUN BLDV-MCNC: 16 MG/DL (ref 8–23)
BUN/CREAT BLD: 23 (ref 9–20)
CALCIUM SERPL-MCNC: 9.9 MG/DL (ref 8.6–10.4)
CHLORIDE BLD-SCNC: 103 MMOL/L (ref 98–107)
CO2: 25 MMOL/L (ref 20–31)
CREAT SERPL-MCNC: 0.71 MG/DL (ref 0.5–0.9)
DIFFERENTIAL TYPE: ABNORMAL
EKG ATRIAL RATE: 76 BPM
EKG P AXIS: 47 DEGREES
EKG P-R INTERVAL: 144 MS
EKG Q-T INTERVAL: 366 MS
EKG QRS DURATION: 84 MS
EKG QTC CALCULATION (BAZETT): 411 MS
EKG R AXIS: -23 DEGREES
EKG T AXIS: 41 DEGREES
EKG VENTRICULAR RATE: 76 BPM
EOSINOPHILS RELATIVE PERCENT: 3 % (ref 1–4)
GFR AFRICAN AMERICAN: >60 ML/MIN
GFR NON-AFRICAN AMERICAN: >60 ML/MIN
GFR SERPL CREATININE-BSD FRML MDRD: ABNORMAL ML/MIN/{1.73_M2}
GFR SERPL CREATININE-BSD FRML MDRD: ABNORMAL ML/MIN/{1.73_M2}
GLUCOSE BLD-MCNC: 114 MG/DL (ref 70–99)
HCT VFR BLD CALC: 45.1 % (ref 36.3–47.1)
HEMOGLOBIN: 14.4 G/DL (ref 11.9–15.1)
IMMATURE GRANULOCYTES: 1 %
LYMPHOCYTES # BLD: 25 % (ref 24–43)
MCH RBC QN AUTO: 30.1 PG (ref 25.2–33.5)
MCHC RBC AUTO-ENTMCNC: 31.9 G/DL (ref 28.4–34.8)
MCV RBC AUTO: 94.4 FL (ref 82.6–102.9)
MONOCYTES # BLD: 9 % (ref 3–12)
NRBC AUTOMATED: 0 PER 100 WBC
PDW BLD-RTO: 12.4 % (ref 11.8–14.4)
PLATELET # BLD: 285 K/UL (ref 138–453)
PLATELET ESTIMATE: ABNORMAL
PMV BLD AUTO: 10.9 FL (ref 8.1–13.5)
POTASSIUM SERPL-SCNC: 4.9 MMOL/L (ref 3.7–5.3)
RBC # BLD: 4.78 M/UL (ref 3.95–5.11)
RBC # BLD: ABNORMAL 10*6/UL
SEG NEUTROPHILS: 61 % (ref 36–65)
SEGMENTED NEUTROPHILS ABSOLUTE COUNT: 5.02 K/UL (ref 1.5–8.1)
SODIUM BLD-SCNC: 137 MMOL/L (ref 135–144)
WBC # BLD: 8 K/UL (ref 3.5–11.3)
WBC # BLD: ABNORMAL 10*3/UL

## 2020-07-29 PROCEDURE — 36415 COLL VENOUS BLD VENIPUNCTURE: CPT

## 2020-07-29 PROCEDURE — 93005 ELECTROCARDIOGRAM TRACING: CPT | Performed by: ORTHOPAEDIC SURGERY

## 2020-07-29 PROCEDURE — 85025 COMPLETE CBC W/AUTO DIFF WBC: CPT

## 2020-07-29 PROCEDURE — 93010 ELECTROCARDIOGRAM REPORT: CPT | Performed by: INTERNAL MEDICINE

## 2020-07-29 PROCEDURE — 80048 BASIC METABOLIC PNL TOTAL CA: CPT

## 2020-08-06 RX ORDER — IBUPROFEN 200 MG
200 TABLET ORAL EVERY 6 HOURS PRN
COMMUNITY
End: 2021-12-06

## 2020-08-11 ENCOUNTER — HOSPITAL ENCOUNTER (OUTPATIENT)
Dept: GENERAL RADIOLOGY | Age: 65
Discharge: HOME | End: 2020-08-13
Payer: MEDICARE

## 2020-08-11 ENCOUNTER — HOSPITAL ENCOUNTER (OUTPATIENT)
Age: 65
Discharge: HOME | End: 2020-08-13
Payer: MEDICARE

## 2020-08-11 PROCEDURE — 74018 RADEX ABDOMEN 1 VIEW: CPT

## 2020-08-12 ENCOUNTER — OFFICE VISIT (OUTPATIENT)
Dept: UROLOGY | Age: 65
End: 2020-08-12
Payer: MEDICARE

## 2020-08-12 ENCOUNTER — HOSPITAL ENCOUNTER (OUTPATIENT)
Age: 65
Setting detail: SPECIMEN
Discharge: HOME | End: 2020-08-12
Payer: MEDICARE

## 2020-08-12 VITALS
DIASTOLIC BLOOD PRESSURE: 78 MMHG | SYSTOLIC BLOOD PRESSURE: 122 MMHG | WEIGHT: 230.8 LBS | HEIGHT: 66 IN | TEMPERATURE: 97.8 F | BODY MASS INDEX: 37.09 KG/M2

## 2020-08-12 LAB
-: ABNORMAL
AMORPHOUS: ABNORMAL
BACTERIA: ABNORMAL
BILIRUBIN URINE: NEGATIVE
CASTS UA: ABNORMAL /LPF
COLOR: YELLOW
COMMENT UA: ABNORMAL
CRYSTALS, UA: ABNORMAL /HPF
EPITHELIAL CELLS UA: ABNORMAL /HPF (ref 0–25)
GLUCOSE URINE: NEGATIVE
KETONES, URINE: NEGATIVE
LEUKOCYTE ESTERASE, URINE: ABNORMAL
MUCUS: ABNORMAL
NITRITE, URINE: NEGATIVE
OTHER OBSERVATIONS UA: ABNORMAL
PH UA: 5.5 (ref 5–9)
PROTEIN UA: NEGATIVE
RBC UA: ABNORMAL /HPF (ref 0–2)
RENAL EPITHELIAL, UA: ABNORMAL /HPF
SPECIFIC GRAVITY UA: 1.02 (ref 1.01–1.02)
TRICHOMONAS: ABNORMAL
TURBIDITY: ABNORMAL
URINE HGB: ABNORMAL
UROBILINOGEN, URINE: NORMAL
WBC UA: ABNORMAL /HPF (ref 0–5)
YEAST: ABNORMAL

## 2020-08-12 PROCEDURE — G8417 CALC BMI ABV UP PARAM F/U: HCPCS | Performed by: UROLOGY

## 2020-08-12 PROCEDURE — 4040F PNEUMOC VAC/ADMIN/RCVD: CPT | Performed by: UROLOGY

## 2020-08-12 PROCEDURE — 87086 URINE CULTURE/COLONY COUNT: CPT

## 2020-08-12 PROCEDURE — 1123F ACP DISCUSS/DSCN MKR DOCD: CPT | Performed by: UROLOGY

## 2020-08-12 PROCEDURE — 81001 URINALYSIS AUTO W/SCOPE: CPT

## 2020-08-12 PROCEDURE — 99214 OFFICE O/P EST MOD 30 MIN: CPT | Performed by: UROLOGY

## 2020-08-12 PROCEDURE — 99211 OFF/OP EST MAY X REQ PHY/QHP: CPT | Performed by: UROLOGY

## 2020-08-12 PROCEDURE — 3017F COLORECTAL CA SCREEN DOC REV: CPT | Performed by: UROLOGY

## 2020-08-12 PROCEDURE — 1036F TOBACCO NON-USER: CPT | Performed by: UROLOGY

## 2020-08-12 PROCEDURE — 1090F PRES/ABSN URINE INCON ASSESS: CPT | Performed by: UROLOGY

## 2020-08-12 PROCEDURE — G8427 DOCREV CUR MEDS BY ELIG CLIN: HCPCS | Performed by: UROLOGY

## 2020-08-12 PROCEDURE — G8400 PT W/DXA NO RESULTS DOC: HCPCS | Performed by: UROLOGY

## 2020-08-12 ASSESSMENT — ENCOUNTER SYMPTOMS
NAUSEA: 0
VOMITING: 0
BACK PAIN: 0
COUGH: 0
SHORTNESS OF BREATH: 0
ABDOMINAL PAIN: 0
WHEEZING: 0
COLOR CHANGE: 0
EYE REDNESS: 0
EYE PAIN: 0

## 2020-08-12 NOTE — PATIENT INSTRUCTIONS
SURVEY:    You may be receiving a survey from [employer] regarding your visit today. Please complete the survey to enable us to provide the highest quality of care to you and your family. If you cannot score us a very good on any question, please call the office to discuss how we could of made your experience a very good one. Thank you.

## 2020-08-12 NOTE — PROGRESS NOTES
HPI:    Patient is a 77 y.o. female in no acute distress. She is alert and oriented to person, place, and time. History  Previous patient of Dr. Olvin Sheikh.     3/2009 left ESWL for 16x8mm and 6x6mm stones. 100% uric acid stones. 7/2012 on urocit-k 15 mEq BID and allopurinol 300mg daily     3/2017 Referred by PCP due to microhematuria, UA showed 20-50 RBCs, it also showed uric acid crystals. Denies any gross hematuria.  CT urogram showed a 25 x 20 mm stone in the lower pole the left kidney.       5/2/2017 and 5/9/2017 staged left ESWL     6/28/2018 left HLL     Currently  Patient is here for annual follow-up. Patient does think she may have passed a stone approximately 3 weeks ago. Patient negative KUB today. This film was independently reviewed today. This does show  No significant  calcifications. Patient is otherwise doing well. Patient is having no significant flank pain nausea vomiting right now. Patient does report stress urinary incontinence. She does wear a poise pad. She is interested in pelvic floor muscle training. No pain today.     Past Medical History:   Diagnosis Date    Allergic rhinitis     Colon polyp     Hyperlipidemia     Impaired fasting glucose     Internal hemorrhoids     Kidney stones     Lump or mass in breast     right     Past Surgical History:   Procedure Laterality Date    BREAST BIOPSY Right 6/7/2013    [facility] in [place] - benign    BREAST SURGERY  2000    Right /cyst drained    COLONOSCOPY  8/09    COLONOSCOPY  2/6/2013    colon polyps    CYSTOSCOPY Left 5/2/2017    CYSTOSCOPY URETERAL STENT INSERTION performed by Xiao Joe MD at [facility]  4/14/2009,3/20/2009,3/18/2009    LITHOTRIPSY  3/20/2009    LITHOTRIPSY Left 5/2/2017    ESWL EXTRACORPEAL SHOCK WAVE LITHOTRIPSY performed by Xiao Joe MD at [facility] LITHOTRIPSY Left 05/09/2017    per Dr. Narayan Gonzalez LITHOTRIPSY Left 5/9/2017    ESWL [address]  LITHOTRIPSY performed by Young Gibbs MD at [facility] OFFICE/OUTPT [address] Left 6/28/2018    LEFT CYSTOSCOPY URETEROSCOPY LASER - [address] [facility] [address] performed by Young Gibbs MD at [facility] PRE-MALIGNANT / [facility]  7/09    posterior trunk    TONSILLECTOMY AND ADENOIDECTOMY       Outpatient Encounter Medications as of 8/12/2020   Medication Sig Dispense Refill    ibuprofen (ADVIL;MOTRIN) 200 MG tablet Take 200 mg by mouth every 6 hours as needed for Pain      Potassium Citrate ER 15 MEQ (1620 MG) TBCR Take 15 mEq by mouth 2 times daily (Patient taking differently: Take 15 mEq by mouth as needed ) 180 tablet 1    atorvastatin (LIPITOR) 20 MG tablet Take 1 tablet by mouth daily 90 tablet 3    Vitamin D (CHOLECALCIFEROL) 1000 UNITS CAPS capsule Take 1,000 Units by mouth daily.  Cetirizine HCl (ZYRTEC PO) Take 1 tablet by mouth daily. No facility-administered encounter medications on file as of 8/12/2020. Current Outpatient Medications on File Prior to Visit   Medication Sig Dispense Refill    ibuprofen (ADVIL;MOTRIN) 200 MG tablet Take 200 mg by mouth every 6 hours as needed for Pain      Potassium Citrate ER 15 MEQ (1620 MG) TBCR Take 15 mEq by mouth 2 times daily (Patient taking differently: Take 15 mEq by mouth as needed ) 180 tablet 1    atorvastatin (LIPITOR) 20 MG tablet Take 1 tablet by mouth daily 90 tablet 3    Vitamin D (CHOLECALCIFEROL) 1000 UNITS CAPS capsule Take 1,000 Units by mouth daily.  Cetirizine HCl (ZYRTEC PO) Take 1 tablet by mouth daily. No current facility-administered medications on file prior to visit.       Pcn [penicillins]  Family History   Problem Relation Age of Onset   [facility] Cancer Sister         breast    Diabetes Sister    [facility] Stroke Mother     Diabetes Mother     Stroke Father     Diabetes Father      Social History     Tobacco Use   Smoking Status Never Smoker   Smokeless Tobacco Never Used Social History     Substance and Sexual Activity   Alcohol Use Yes    Comment: ocassional       Review of Systems   Constitutional: Negative for appetite change, chills and fever. Eyes: Negative for pain, redness and visual disturbance. Respiratory: Negative for cough, shortness of breath and wheezing. Cardiovascular: Negative for chest pain and leg swelling. Gastrointestinal: Negative for abdominal pain, nausea and vomiting. Genitourinary: Positive for enuresis. Negative for difficulty urinating, dysuria, flank pain, frequency, hematuria, pelvic pain, vaginal bleeding and vaginal discharge. Musculoskeletal: Negative for back pain, joint swelling and myalgias. Skin: Negative for color change, rash and wound. Neurological: Negative for dizziness, tremors and numbness. Hematological: Negative for adenopathy. Does not bruise/bleed easily. /78 (Site: Right Upper Arm, Position: Sitting, Cuff Size: Large Adult)   Temp 97.8 °F (36.6 °C) (Temporal)   Ht 5' 5.5\" (1.664 m)   Wt 230 lb 12.8 oz (104.7 kg)   LMP  (LMP Unknown)   BMI 37.82 kg/m²       PHYSICAL EXAM:  Constitutional: Patient resting comfortably, in no acute distress. Neuro: Alert and oriented to person place and time. Cranial nerves grossly intact. Psych: Mood and affect normal.  Skin: Warm, dry  HEENT: normocephalic, atraumatic  Lymphatics: No palpable lymphadenopathy  Lungs: Respiratory effort normal, unlabored  Cardiovascular:  Normal peripheral pulses  Abdomen: Soft, non-tender, non-distended with no organomegaly or palpable masses. : No CVA tenderness. Bladder non-tender and not distended. Pelvic: deferred    Lab Results   Component Value Date    BUN 16 07/29/2020     Lab Results   Component Value Date    CREATININE 0.71 07/29/2020       ASSESSMENT:  This is a 77 y.o. female with the following diagnoses:   Diagnosis Orders   1. Stress incontinence  Urinalysis with Microscopic    Culture, Urine   2.  Kidney stones XR ABDOMEN (KUB) (SINGLE AP VIEW)         PLAN:  We will plan for KUB in 1 year for stone follow-up. We did discuss pelvic floor muscle training. We did give her literature today. She is interested and would like her urine checked. We will hold off on scheduling until she calls us back.

## 2020-08-13 DIAGNOSIS — Z01.818 PREOP TESTING: Primary | ICD-10-CM

## 2020-08-13 LAB
CULTURE: NORMAL
Lab: NORMAL
SPECIMEN DESCRIPTION: NORMAL

## 2020-08-14 ENCOUNTER — HOSPITAL ENCOUNTER (OUTPATIENT)
Dept: PREADMISSION TESTING | Age: 65
Setting detail: SPECIMEN
Discharge: HOME | End: 2020-08-18
Payer: MEDICARE

## 2020-08-14 PROCEDURE — C9803 HOPD COVID-19 SPEC COLLECT: HCPCS

## 2020-08-14 PROCEDURE — U0003 INFECTIOUS AGENT DETECTION BY NUCLEIC ACID (DNA OR RNA); SEVERE ACUTE RESPIRATORY SYNDROME CORONAVIRUS 2 (SARS-COV-2) (CORONAVIRUS DISEASE [COVID-19]), AMPLIFIED PROBE TECHNIQUE, MAKING USE OF HIGH THROUGHPUT TECHNOLOGIES AS DESCRIBED BY CMS-2020-01-R: HCPCS

## 2020-08-16 LAB — SARS-COV-2, NAA: NOT DETECTED

## 2020-08-18 NOTE — PROGRESS NOTES
Clarified pre-op antibiotic ancef order with Maren Lopez at Dr. Liliana Lance office.  Ancef of pre-op per Dr. Ziyad Lugo

## 2020-08-20 ENCOUNTER — ANESTHESIA EVENT (OUTPATIENT)
Dept: OPERATING ROOM | Age: 65
End: 2020-08-20
Payer: MEDICARE

## 2020-08-20 NOTE — ANESTHESIA POSTPROCEDURE EVALUATION
Department of Anesthesiology  Postprocedure Note    Patient: Rosmery Baker  MRN: [id number]  YOB: 1955  Date of evaluation: 8/20/2020  Time:  12:46 PM     Procedure Summary     Date:  08/21/20 Room / Location:  [address] [place] [address]    Anesthesia Start:   Anesthesia Stop:      Procedures:       CARPAL TUNNEL RELEASE ENDOSCOPIC (Right )      FINGER TRIGGER RELEASE--INDEX (Right )      INJECTION MEDICATION-CMC JOINT INJECTIN (Right ) Diagnosis:  (RIGHT CARPAL TUNNEL SYNDROME)    Surgeon:  Naldo Mixon MD Responsible Provider:      Anesthesia Type:  Not recorded ASA Status:  Not recorded          Anesthesia Type: No value filed. Madai Phase I:      Madai Phase II:      Last vitals: Reviewed and per EMR flowsheets.        Anesthesia Post Evaluation    Patient location during evaluation: bedside  Patient participation: complete - patient participated  Level of consciousness: awake and alert  Pain score: 0  Nausea & Vomiting: no vomiting and no nausea  Complications: no  Cardiovascular status: blood pressure returned to baseline  Respiratory status: acceptable  Hydration status: euvolemic

## 2020-08-21 ENCOUNTER — HOSPITAL ENCOUNTER (OUTPATIENT)
Age: 65
Setting detail: OUTPATIENT SURGERY
Discharge: HOME HEALTH | End: 2020-08-21
Attending: ORTHOPAEDIC SURGERY | Admitting: ORTHOPAEDIC SURGERY
Payer: MEDICARE

## 2020-08-21 ENCOUNTER — ANESTHESIA (OUTPATIENT)
Dept: OPERATING ROOM | Age: 65
End: 2020-08-21
Payer: MEDICARE

## 2020-08-21 VITALS
OXYGEN SATURATION: 96 % | DIASTOLIC BLOOD PRESSURE: 68 MMHG | BODY MASS INDEX: 36.96 KG/M2 | TEMPERATURE: 97.2 F | WEIGHT: 230 LBS | SYSTOLIC BLOOD PRESSURE: 149 MMHG | HEART RATE: 55 BPM | RESPIRATION RATE: 16 BRPM | HEIGHT: 66 IN

## 2020-08-21 VITALS — OXYGEN SATURATION: 98 % | DIASTOLIC BLOOD PRESSURE: 63 MMHG | SYSTOLIC BLOOD PRESSURE: 112 MMHG | TEMPERATURE: 98.6 F

## 2020-08-21 PROBLEM — G56.01 RIGHT CARPAL TUNNEL SYNDROME: Status: ACTIVE | Noted: 2020-08-21

## 2020-08-21 PROCEDURE — 6360000002 HC RX W HCPCS: Performed by: NURSE ANESTHETIST, CERTIFIED REGISTERED

## 2020-08-21 PROCEDURE — 2500000003 HC RX 250 WO HCPCS: Performed by: NURSE ANESTHETIST, CERTIFIED REGISTERED

## 2020-08-21 PROCEDURE — 3700000000 HC ANESTHESIA ATTENDED CARE: Performed by: ORTHOPAEDIC SURGERY

## 2020-08-21 PROCEDURE — 2580000003 HC RX 258: Performed by: ORTHOPAEDIC SURGERY

## 2020-08-21 PROCEDURE — 7100000011 HC PHASE II RECOVERY - ADDTL 15 MIN: Performed by: ORTHOPAEDIC SURGERY

## 2020-08-21 PROCEDURE — 2500000003 HC RX 250 WO HCPCS: Performed by: ORTHOPAEDIC SURGERY

## 2020-08-21 PROCEDURE — 6360000002 HC RX W HCPCS: Performed by: ORTHOPAEDIC SURGERY

## 2020-08-21 PROCEDURE — 7100000010 HC PHASE II RECOVERY - FIRST 15 MIN: Performed by: ORTHOPAEDIC SURGERY

## 2020-08-21 PROCEDURE — 3600000014 HC SURGERY LEVEL 4 ADDTL 15MIN: Performed by: ORTHOPAEDIC SURGERY

## 2020-08-21 PROCEDURE — 2709999900 HC NON-CHARGEABLE SUPPLY: Performed by: ORTHOPAEDIC SURGERY

## 2020-08-21 PROCEDURE — 3600000004 HC SURGERY LEVEL 4 BASE: Performed by: ORTHOPAEDIC SURGERY

## 2020-08-21 PROCEDURE — 3700000001 HC ADD 15 MINUTES (ANESTHESIA): Performed by: ORTHOPAEDIC SURGERY

## 2020-08-21 RX ORDER — HYDROCODONE BITARTRATE AND ACETAMINOPHEN 5; 325 MG/1; MG/1
1 TABLET ORAL PRN
Status: DISCONTINUED | OUTPATIENT
Start: 2020-08-21 | End: 2020-08-21 | Stop reason: HOSPADM

## 2020-08-21 RX ORDER — LIDOCAINE HYDROCHLORIDE 10 MG/ML
1 INJECTION, SOLUTION EPIDURAL; INFILTRATION; INTRACAUDAL; PERINEURAL
Status: DISCONTINUED | OUTPATIENT
Start: 2020-08-21 | End: 2020-08-21 | Stop reason: HOSPADM

## 2020-08-21 RX ORDER — METHYLPREDNISOLONE ACETATE 40 MG/ML
INJECTION, SUSPENSION INTRA-ARTICULAR; INTRALESIONAL; INTRAMUSCULAR; SOFT TISSUE PRN
Status: DISCONTINUED | OUTPATIENT
Start: 2020-08-21 | End: 2020-08-21 | Stop reason: ALTCHOICE

## 2020-08-21 RX ORDER — HYDROCODONE BITARTRATE AND ACETAMINOPHEN 5; 325 MG/1; MG/1
1 TABLET ORAL EVERY 6 HOURS PRN
Qty: 10 TABLET | Refills: 0 | Status: SHIPPED | OUTPATIENT
Start: 2020-08-21 | End: 2020-08-24

## 2020-08-21 RX ORDER — FENTANYL CITRATE 50 UG/ML
50 INJECTION, SOLUTION INTRAMUSCULAR; INTRAVENOUS EVERY 5 MIN PRN
Status: DISCONTINUED | OUTPATIENT
Start: 2020-08-21 | End: 2020-08-21 | Stop reason: HOSPADM

## 2020-08-21 RX ORDER — HYDROCODONE BITARTRATE AND ACETAMINOPHEN 5; 325 MG/1; MG/1
2 TABLET ORAL PRN
Status: DISCONTINUED | OUTPATIENT
Start: 2020-08-21 | End: 2020-08-21 | Stop reason: HOSPADM

## 2020-08-21 RX ORDER — FENTANYL CITRATE 50 UG/ML
INJECTION, SOLUTION INTRAMUSCULAR; INTRAVENOUS PRN
Status: DISCONTINUED | OUTPATIENT
Start: 2020-08-21 | End: 2020-08-21 | Stop reason: SDUPTHER

## 2020-08-21 RX ORDER — FENTANYL CITRATE 50 UG/ML
25 INJECTION, SOLUTION INTRAMUSCULAR; INTRAVENOUS EVERY 5 MIN PRN
Status: DISCONTINUED | OUTPATIENT
Start: 2020-08-21 | End: 2020-08-21 | Stop reason: HOSPADM

## 2020-08-21 RX ORDER — ACETAMINOPHEN 500 MG
500 TABLET ORAL EVERY 6 HOURS PRN
Status: ON HOLD | COMMUNITY
End: 2020-08-21 | Stop reason: HOSPADM

## 2020-08-21 RX ORDER — ONDANSETRON 2 MG/ML
4 INJECTION INTRAMUSCULAR; INTRAVENOUS
Status: DISCONTINUED | OUTPATIENT
Start: 2020-08-21 | End: 2020-08-21 | Stop reason: HOSPADM

## 2020-08-21 RX ORDER — SODIUM CHLORIDE, SODIUM LACTATE, POTASSIUM CHLORIDE, CALCIUM CHLORIDE 600; 310; 30; 20 MG/100ML; MG/100ML; MG/100ML; MG/100ML
INJECTION, SOLUTION INTRAVENOUS CONTINUOUS
Status: DISCONTINUED | OUTPATIENT
Start: 2020-08-21 | End: 2020-08-21 | Stop reason: HOSPADM

## 2020-08-21 RX ORDER — LIDOCAINE HYDROCHLORIDE 20 MG/ML
INJECTION, SOLUTION EPIDURAL; INFILTRATION; INTRACAUDAL; PERINEURAL PRN
Status: DISCONTINUED | OUTPATIENT
Start: 2020-08-21 | End: 2020-08-21 | Stop reason: SDUPTHER

## 2020-08-21 RX ORDER — 0.9 % SODIUM CHLORIDE 0.9 %
500 INTRAVENOUS SOLUTION INTRAVENOUS
Status: DISCONTINUED | OUTPATIENT
Start: 2020-08-21 | End: 2020-08-21 | Stop reason: HOSPADM

## 2020-08-21 RX ORDER — PROPOFOL 10 MG/ML
INJECTION, EMULSION INTRAVENOUS PRN
Status: DISCONTINUED | OUTPATIENT
Start: 2020-08-21 | End: 2020-08-21 | Stop reason: SDUPTHER

## 2020-08-21 RX ORDER — BUPIVACAINE HYDROCHLORIDE 5 MG/ML
INJECTION, SOLUTION EPIDURAL; INTRACAUDAL PRN
Status: DISCONTINUED | OUTPATIENT
Start: 2020-08-21 | End: 2020-08-21 | Stop reason: ALTCHOICE

## 2020-08-21 RX ADMIN — SODIUM CHLORIDE, POTASSIUM CHLORIDE, SODIUM LACTATE AND CALCIUM CHLORIDE: 600; 310; 30; 20 INJECTION, SOLUTION INTRAVENOUS at 09:38

## 2020-08-21 RX ADMIN — PROPOFOL 100 MG: 10 INJECTION, EMULSION INTRAVENOUS at 10:40

## 2020-08-21 RX ADMIN — PROPOFOL 100 MG: 10 INJECTION, EMULSION INTRAVENOUS at 10:33

## 2020-08-21 RX ADMIN — PROPOFOL 100 MG: 10 INJECTION, EMULSION INTRAVENOUS at 10:49

## 2020-08-21 RX ADMIN — DEXTROSE MONOHYDRATE 2 G: 50 INJECTION, SOLUTION INTRAVENOUS at 10:19

## 2020-08-21 RX ADMIN — FENTANYL CITRATE 100 MCG: 50 INJECTION, SOLUTION INTRAMUSCULAR; INTRAVENOUS at 10:26

## 2020-08-21 RX ADMIN — LIDOCAINE HYDROCHLORIDE 5 ML: 20 INJECTION, SOLUTION EPIDURAL; INFILTRATION; INTRACAUDAL; PERINEURAL at 10:32

## 2020-08-21 ASSESSMENT — LIFESTYLE VARIABLES: SMOKING_STATUS: 0

## 2020-08-21 ASSESSMENT — PAIN SCALES - GENERAL
PAINLEVEL_OUTOF10: 0

## 2020-08-21 ASSESSMENT — PAIN DESCRIPTION - DESCRIPTORS: DESCRIPTORS: NAGGING;DULL

## 2020-08-21 ASSESSMENT — PAIN - FUNCTIONAL ASSESSMENT: PAIN_FUNCTIONAL_ASSESSMENT: 0-10

## 2020-08-21 NOTE — ANESTHESIA POSTPROCEDURE EVALUATION
Department of Anesthesiology  Postprocedure Note    Patient: Fatmata Centeno  MRN: [id number]  YOB: 1955  Date of evaluation: 8/21/2020  Time:  3:06 PM     Procedure Summary     Date:  08/21/20 Room / Location:  [address] [place] [address]    Anesthesia Start:  1026 Anesthesia Stop:  8114    Procedures:       CARPAL TUNNEL RELEASE ENDOSCOPIC (Right )      FINGER TRIGGER RELEASE--INDEX (Right )      INJECTION MEDICATION-CMC JOINT INJECTIN (Right ) Diagnosis:  (RIGHT CARPAL TUNNEL SYNDROME)    Surgeon:  Yassine Byers MD Responsible Provider:  JULIET Lan CRNA    Anesthesia Type:  MAC ASA Status:  2          Anesthesia Type: MAC    Madai Phase I: Madai Score: 10    Madai Phase II: Madai Score: 9    Last vitals: Reviewed and per EMR flowsheets.        Anesthesia Post Evaluation    Patient location during evaluation: PACU  Patient participation: complete - patient participated  Level of consciousness: awake and alert  Pain score: 0  Airway patency: patent  Nausea & Vomiting: no nausea and no vomiting  Complications: no  Cardiovascular status: blood pressure returned to baseline  Respiratory status: acceptable and room air  Hydration status: stable

## 2020-08-21 NOTE — ANESTHESIA PRE PROCEDURE
Department of Anesthesiology  Preprocedure Note       Name:  Annika Real   Age:  77 y.o.  :  1955                                          MRN:  [id number]         Date:  2020      Surgeon: Tracy Durán):  Juan Alberto Teixeira MD    Procedure: Procedure(s):  LEFT CYSTOSCOPY URETEROSCOPY LASER - HLL POSSIBLE STENT PLACEMENT    Medications prior to admission:   Prior to Admission medications    Medication Sig Start Date End Date Taking? Authorizing Provider   acetaminophen (TYLENOL) 500 MG tablet Take 500 mg by mouth every 6 hours as needed for Pain    Historical Provider, MD   ibuprofen (ADVIL;MOTRIN) 200 MG tablet Take 200 mg by mouth every 6 hours as needed for Pain    Historical Provider, MD   Potassium Citrate ER 15 MEQ (1620 MG) TBCR Take 15 mEq by mouth 2 times daily  Patient taking differently: Take 15 mEq by mouth as needed  19   JULIET Horton - CNP   atorvastatin (LIPITOR) 20 MG tablet Take 1 tablet by mouth daily 19   Wyatt Barnhart MD   Vitamin D (CHOLECALCIFEROL) 1000 UNITS CAPS capsule Take 1,000 Units by mouth daily. Historical Provider, MD   Cetirizine HCl (ZYRTEC PO) Take 1 tablet by mouth daily. Historical Provider, MD       Current medications:    No current facility-administered medications for this visit. No current outpatient medications on file. Facility-Administered Medications Ordered in Other Visits   Medication Dose Route Frequency Provider Last Rate Last Dose    ceFAZolin (ANCEF) 2 g in dextrose 3 % 50 mL IVPB (duplex)  2 g Intravenous Once Juan Alberto Teixeira MD        lactated ringers infusion   Intravenous Continuous Juan Alberto Teixeira  mL/hr at 20 0938      lidocaine PF 1 % injection 1 mL  1 mL Intradermal Once PRN Juan Alberto Teixeira MD           Allergies:     Allergies   Allergen Reactions    Pcn [Penicillins]      AS A CHILD       Problem List:    Patient Active Problem List   Diagnosis Code    History of colonic polyps Z86.010    NAFLD (nonalcoholic fatty liver disease) K76.0    Hyperlipidemia E78.5    Impaired fasting glucose R73.01    Kidney stones N20.0    Renal stones N20.0    Hematuria R31.9       Past Medical History:        Diagnosis Date    Allergic rhinitis     Colon polyp     Hyperlipidemia     Impaired fasting glucose     Internal hemorrhoids     Kidney stones     Lump or mass in breast     right       Past Surgical History:        Procedure Laterality Date    BREAST BIOPSY Right 6/7/2013    [facility] [place] - benign    BREAST SURGERY  2000    Right /cyst drained    COLONOSCOPY  8/09    COLONOSCOPY  2/6/2013    colon polyps    CYSTOSCOPY Left 5/2/2017    CYSTOSCOPY URETERAL STENT [facility]ION [facility]  4/14/2009,3/20/2009,3/18/2009    LITHOTRIPSY  3/20/2009    LITHOTRIPSY Left 5/2/2017    ESWL EXTRACORPEAL SHOCK WAVE LITHOTRIPSY performed by Leilani Richardson MD at [facility] LITHOTRIPSY Left 05/09/2017    per Dr. Hanley Boards LITHOTRIPSY Left 5/9/2017    ESWL [address] LITHOTRIPSY performed by Leilani Richardson MD at [address] [facility] [address] OFFICE/OUTPT [address] [facility] [address] Left 6/28/2018    LEFT CYSTOSCOPY URETEROSCOPY LASER - [address] performed by Leilani Richardson MD at [facility] PRE-MALIGNANT / [address]  7/09    posterior trunk    TONSILLECTOMY AND ADENOIDECTOMY         Social History:    Social History     Tobacco Use    Smoking status: Never Smoker    Smokeless tobacco: Never Used   Substance Use Topics    Alcohol use: Yes     Comment: ocassional                                Counseling given: Not Answered      Vital Signs (Current): There were no vitals filed for this visit.                                            BP Readings from Last 3 Encounters:   08/21/20 (!) 182/83   08/12/20 122/78   05/28/20 112/67       NPO Status: CVA           GI/Hepatic/Renal:   (+) liver disease (NAFLD):, renal disease: kidney stones, morbid obesity     (-) GERD       Endo/Other:    (+) : arthritis:., .    (-) diabetes mellitus, hypothyroidism               Abdominal:           Vascular: negative vascular ROS. - DVT and PE. Department of Anesthesiology  Preprocedure Note       Name:  Chucho Hatfield   Age:  77 y.o.  :  1955                                          MRN:  [id number]         Date:  2020      Surgeon: Carmen Arroyo):  Eugenio Gtz MD    Procedure: Procedure(s):  LEFT CYSTOSCOPY URETEROSCOPY LASER - HLL POSSIBLE STENT PLACEMENT    Medications prior to admission:   Prior to Admission medications    Medication Sig Start Date End Date Taking? Authorizing Provider   acetaminophen (TYLENOL) 500 MG tablet Take 500 mg by mouth every 6 hours as needed for Pain    Historical Provider, MD   ibuprofen (ADVIL;MOTRIN) 200 MG tablet Take 200 mg by mouth every 6 hours as needed for Pain    Historical Provider, MD   Potassium Citrate ER 15 MEQ (1620 MG) TBCR Take 15 mEq by mouth 2 times daily  Patient taking differently: Take 15 mEq by mouth as needed  19   Lucas Kohler APRN - CNP   atorvastatin (LIPITOR) 20 MG tablet Take 1 tablet by mouth daily 19   Rico Jamil MD   Vitamin D (CHOLECALCIFEROL) 1000 UNITS CAPS capsule Take 1,000 Units by mouth daily. Historical Provider, MD   Cetirizine HCl (ZYRTEC PO) Take 1 tablet by mouth daily. Historical Provider, MD       Current medications:    No current facility-administered medications for this visit. No current outpatient medications on file.      Facility-Administered Medications Ordered in Other Visits   Medication Dose Route Frequency Provider Last Rate Last Dose    ceFAZolin (ANCEF) 2 g in dextrose 3 % 50 mL IVPB (duplex)  2 g Intravenous Once Eugenio Gtz MD        lactated ringers infusion   Intravenous Continuous Aaron Brown Counseling given: Not Answered      Vital Signs (Current): There were no vitals filed for this visit. BP Readings from Last 3 Encounters:   08/21/20 (!) 182/83   08/12/20 122/78   05/28/20 112/67       NPO Status:                                                                                 BMI:   Wt Readings from Last 3 Encounters:   08/21/20 230 lb (104.3 kg)   08/12/20 230 lb 12.8 oz (104.7 kg)   05/28/20 229 lb (103.9 kg)     There is no height or weight on file to calculate BMI.    CBC:   Lab Results   Component Value Date    WBC 8.0 07/29/2020    RBC 4.78 07/29/2020    RBC 4.63 05/20/2020    HGB 14.4 07/29/2020    HCT 45.1 07/29/2020    MCV 94.4 07/29/2020    RDW 12.4 07/29/2020     07/29/2020       CMP:   Lab Results   Component Value Date     07/29/2020    K 4.9 07/29/2020     07/29/2020    CO2 25 07/29/2020    BUN 16 07/29/2020    CREATININE 0.71 07/29/2020    GFRAA >60 07/29/2020    LABGLOM >60 07/29/2020    GLUCOSE 114 07/29/2020    GLUCOSE 127 05/20/2020    PROT 7.3 06/26/2018    CALCIUM 9.9 07/29/2020    BILITOT 1.14 06/26/2018    ALKPHOS 104 06/26/2018    AST 19 05/20/2020    ALT 28 05/20/2020       POC Tests: No results for input(s): POCGLU, POCNA, POCK, POCCL, POCBUN, POCHEMO, POCHCT in the last 72 hours.     Coags: No results found for: PROTIME, INR, APTT    HCG (If Applicable): No results found for: PREGTESTUR, PREGSERUM, HCG, HCGQUANT     ABGs: No results found for: PHART, PO2ART, AET0CKA, FUK4JTS, BEART, X4RXBYBG     Type & Screen (If Applicable):  No results found for: LABABO, [address]    Anesthesia Evaluation  Patient summary reviewed and Nursing notes reviewed no history of anesthetic complications:   Airway:         Dental:          Pulmonary:Negative Pulmonary ROS                              Cardiovascular:Negative CV ROS                      Neuro/Psych:   Negative Neuro/Psych ROS              GI/Hepatic/Renal: Endo/Other:                     Abdominal:           Vascular: negative vascular ROS. Anesthesia Plan        JULIET Milan CRNA   8/21/2020               Anesthesia Plan      MAC     ASA 2       Induction: intravenous. Anesthetic plan and risks discussed with patient. Use of blood products discussed with patient whom. Plan discussed with CRNA.                 JULIET Milan CRNA   8/21/2020

## 2020-08-21 NOTE — OP NOTE
Preoperative Diagnosis: right carpal tunnel syndrome, right index trigger finger, right thumb CMC joint arthritis    Postoperative Diagnosis: Same    Procedure: right endoscopic carpal tunnel release  Right index trigger finger release  Right thumb CMC joint corticosteroid injection    Surgeon: Radha Mohan    Anesthesiologist:   Niharika Willams CRNA    Anesthesia: Local with MAC    Estimated blood loss:Minimal    Tourniquet time:  8 Minutes at 149 mmHg    Complications: None    Indications for Surgery:  The patient has had signs and symptoms of carpal tunnel syndrome that have failed conservative treatment. Options were discussed with the patient as well as risks and benefits and they have elected to proceed with the surgery. Operative procedure:   Prior to surgery the patient received IV antibiotics. The operative extremity was marked preoperatively. After informed consent was obtained the patient was brought to the operating room where MAC anesthesia was administered. Pre and postoperatively a total of 10 mL of 0.5% Marcaine plain was infiltrated in the operative sites. The right thumb CMC joint was injected with 1 mL of Depo-Medrol 40 and 1 mL Marcaine plain half percent under sterile conditions. .  The arm was then prepped and draped in the usual sterile fashion after placement of a well padded tourniquet. The arm was elevated, exsanguinated, and the tourniquet was inflated. A 1 cm incision was then made in a preexisting distal wrist crease. Hemostasis was achieved with bipolar electrocautery. Blunt dissection was then carried down to the forearm fascia where a U-based flap was created. Proximally the fascia was incised for 2 cm under direct visualization. Attention was then turned to the endoscopic carpal tunnel release. The synovial elevator was used to clear the underside of the transverse carpal ligament of soft tissue. Sequential dilators were then placed.    The endoscopic carpal tunnel released

## 2020-12-09 ENCOUNTER — HOSPITAL ENCOUNTER (OUTPATIENT)
Age: 65
Discharge: HOME | End: 2020-12-09
Payer: MEDICARE

## 2020-12-09 LAB
BUN BLDV-MCNC: 16 MG/DL (ref 8–23)
CREAT SERPL-MCNC: 0.62 MG/DL (ref 0.5–0.9)
GFR AFRICAN AMERICAN: >60 ML/MIN
GFR NON-AFRICAN AMERICAN: >60 ML/MIN
GFR SERPL CREATININE-BSD FRML MDRD: NORMAL ML/MIN/{1.73_M2}
GFR SERPL CREATININE-BSD FRML MDRD: NORMAL ML/MIN/{1.73_M2}

## 2020-12-09 PROCEDURE — 84520 ASSAY OF UREA NITROGEN: CPT

## 2020-12-09 PROCEDURE — 82565 ASSAY OF CREATININE: CPT

## 2020-12-09 PROCEDURE — 36415 COLL VENOUS BLD VENIPUNCTURE: CPT

## 2021-08-09 ENCOUNTER — HOSPITAL ENCOUNTER (OUTPATIENT)
Dept: GENERAL RADIOLOGY | Age: 66
Discharge: HOME | End: 2021-08-11
Payer: MEDICARE

## 2021-08-09 ENCOUNTER — HOSPITAL ENCOUNTER (OUTPATIENT)
Age: 66
Discharge: HOME | End: 2021-08-11
Payer: MEDICARE

## 2021-08-09 DIAGNOSIS — N20.0 KIDNEY STONES: Chronic | ICD-10-CM

## 2021-08-09 PROCEDURE — 74018 RADEX ABDOMEN 1 VIEW: CPT

## 2021-08-11 ENCOUNTER — OFFICE VISIT (OUTPATIENT)
Dept: UROLOGY | Age: 66
End: 2021-08-11
Payer: MEDICARE

## 2021-08-11 VITALS
WEIGHT: 230 LBS | BODY MASS INDEX: 37.69 KG/M2 | TEMPERATURE: 97.1 F | SYSTOLIC BLOOD PRESSURE: 124 MMHG | DIASTOLIC BLOOD PRESSURE: 78 MMHG

## 2021-08-11 DIAGNOSIS — N39.46 MIXED INCONTINENCE: ICD-10-CM

## 2021-08-11 DIAGNOSIS — N20.0 KIDNEY STONES: Primary | ICD-10-CM

## 2021-08-11 PROCEDURE — G8417 CALC BMI ABV UP PARAM F/U: HCPCS | Performed by: PHYSICIAN ASSISTANT

## 2021-08-11 PROCEDURE — 1036F TOBACCO NON-USER: CPT | Performed by: PHYSICIAN ASSISTANT

## 2021-08-11 PROCEDURE — 99211 OFF/OP EST MAY X REQ PHY/QHP: CPT

## 2021-08-11 PROCEDURE — 1090F PRES/ABSN URINE INCON ASSESS: CPT | Performed by: PHYSICIAN ASSISTANT

## 2021-08-11 PROCEDURE — 1123F ACP DISCUSS/DSCN MKR DOCD: CPT | Performed by: PHYSICIAN ASSISTANT

## 2021-08-11 PROCEDURE — G8427 DOCREV CUR MEDS BY ELIG CLIN: HCPCS | Performed by: PHYSICIAN ASSISTANT

## 2021-08-11 PROCEDURE — G8400 PT W/DXA NO RESULTS DOC: HCPCS | Performed by: PHYSICIAN ASSISTANT

## 2021-08-11 PROCEDURE — 99214 OFFICE O/P EST MOD 30 MIN: CPT | Performed by: PHYSICIAN ASSISTANT

## 2021-08-11 PROCEDURE — 3017F COLORECTAL CA SCREEN DOC REV: CPT | Performed by: PHYSICIAN ASSISTANT

## 2021-08-11 PROCEDURE — 4040F PNEUMOC VAC/ADMIN/RCVD: CPT | Performed by: PHYSICIAN ASSISTANT

## 2021-08-11 PROCEDURE — 0509F URINE INCON PLAN DOCD: CPT | Performed by: PHYSICIAN ASSISTANT

## 2021-08-11 ASSESSMENT — ENCOUNTER SYMPTOMS
EYE REDNESS: 0
ABDOMINAL PAIN: 0
WHEEZING: 0
COLOR CHANGE: 0
NAUSEA: 0
APNEA: 0
BACK PAIN: 0
CONSTIPATION: 0
COUGH: 0
SHORTNESS OF BREATH: 0
VOMITING: 0

## 2021-08-11 NOTE — PATIENT INSTRUCTIONS
STONE PREVENTION    To prevent future stone formation:    1) DO drink ~65-80 oz (2-2.5L) of water per day to stay adequately hydrated     2) AVOID/LIMIT intake of \"bad fluids\": soda/pop, coffee, tea, alcohol, energy drinks, any beverage with caffeine can act to dehydrate you       \"BAD FLUIDS\" DO NOT COUNT TOWARD THE 65-80oz of water    3) REDUCE consumption of sodium/salt - DO NOT salt your food, read labels (lunch meats, canned soups, prepared meals are high in salt), choose low salt options    4) DO NOT EAT animal protein/meat more than 2 meals a day - this includes red meat, pork, poultry and fish    BLADDER IRRITANTS     There are several changes you can make to your diet to help improve your urinary symptoms. The following have been shown to cause irritation to the bladder and should be AVOIDED if possible:  ~ Coffee (including decaffeinated)   ~ ALL Tea (including green teas and decaffeinated)  ~ Soda/Pop/carbonated beverages/Energy drinks (especially dark dyed letty, root beers, mountain dew, etc)  ~These are MUCH worse if they have caffeine, but can also be irritative to the bladder even without caffeine  ~ Alcoholic beverages  ~ Spicy foods (peppers contain capsaicin, which is very irritating to the bladder)  ~ Acidic foods (for example: tomato based foods, orange juice, etc)    We encourage increased water intake, unless you have been placed on a fluid restriction by another provider. SURVEY:    You may be receiving a survey from [employer] regarding your visit today. Please complete the survey to enable us to provide the highest quality of care to you and your family. If you cannot score us a very good on any question, please call the office to discuss how we could have made your experience a very good one. Thank you.     Your MA today: Neha Hu

## 2021-08-11 NOTE — PROGRESS NOTES
HPI:    Patient is a 74 y.o. female in no acute distress. She is alert and oriented to person, place, and time. History  Previous patient of Dr. Davis Tay.     3/2009 left ESWL for 16x8mm and 6x6mm stones. 100% uric acid stones. 7/2012 on urocit-k 15 mEq BID and allopurinol 300mg daily     3/2017 Referred by PCP due to microhematuria, UA showed 20-50 RBCs, it also showed uric acid crystals. Denies any gross hematuria.  CT urogram showed a 25 x 20 mm stone in the lower pole the left kidney.       5/2/2017 and 5/9/2017 staged left ESWL     6/28/2018 left HLL     Today:  Patient is here for annual stone and microscopic hematuria. Patient did have KUB prior to visit today which was independently reviewed showing no distinct  calcifications. She denies any spontaneous stone passage over the last year. She denies any gross hematuria. She denies any fever, chills, flank pain, dysuria. Patient states that she is uncertain how often she urinates during the day. She does not have any nocturia. She states that she wears a poise pad during the day. She states it is never saturated. She states that on rare occasion she has incontinence with changing positions pushing and pulling. She states that sometimes she has urge incontinence where she cannot make it to the bathroom in time. She does drink Dr. Jaleel Leroy and Coke approximately 1 can or so a day. The rest today she drinks water mixed with juice. She does have a daily bowel movement which is soft and easy to pass. She is interested in pelvic floor rehabilitation but does note that in the next 2 or 3 months she is going to be very busy with traveling.       Past Medical History:   Diagnosis Date    Allergic rhinitis     Colon polyp     Hyperlipidemia     Impaired fasting glucose     Internal hemorrhoids     Kidney stones     Lump or mass in breast     right     Past Surgical History:   Procedure Laterality Date    BREAST [facility]OPSY Right 6/7/2013    [facility] - benign    BREAST SURGERY  2000    Right /cyst drained    CARPAL TUNNEL RELEASE Right     and trigger finger release    CARPAL TUNNEL RELEASE Right 8/21/2020    CARPAL TUNNEL RELEASE ENDOSCOPIC performed by Marzena Stallworth MD at [address] [facility] [address] [facility] [address]  8/09    COLONOSCOPY  2/6/2013    colon polyps    CYSTOSCOPY Left 5/2/2017    CYSTOSCOPY URETERAL STENT INSERTION performed [facility]  4/14/2009,3/20/2009,3/18/2009    FINGER TRIGGER RELEASE Right 8/21/2020    FINGER TRIGGER RELEASE--INDEX performed by Marzena Stallworth MD at [facility] LITHOTRIPSY  3/20/2009    LITHOTRIPSY Left 5/2/2017    ESWL EXTRACORPEAL SHOCK WAVE [facility]IP[email or web address] Left 05/09/2017    per Dr. Jayce Martinez LITHOTRIPSY Left 5/9/2017    ESWL EXTRACORPEAL SHOCK WAVE LITHOTRIPSY performed by Felisha Varela MD at [address] Right 8/21/2020    INJECTION [place] performed by Marzena St[facility] MD at [address] [facility] Left 6/28/2018    LEFT CYSTOSCOPY URETEROSCOPY LASER - [address] [place] [address] performed by Felisha Varela MD at [address] [place] [address] PRE-MALIGNANT / [address]  7/09    posterior trunk    TONSILLECTOMY AND ADENOIDECTOMY       Outpatient Encounter Medications as of 8/11/2021   Medication Sig Dispense Refill    atorvastatin (LIPITOR) 20 MG tablet Take 1 tablet by mouth daily 90 tablet 3    Potassium Citrate ER 15 MEQ (1620 MG) TBCR Take 15 mEq by mouth 2 times daily (Patient taking differently: Take 15 mEq by mouth as needed ) 180 tablet 1    Vitamin D (CHOLECALCIFEROL) 1000 UNITS CAPS capsule Take 1,000 Units by mouth daily.  Cetirizine HCl (ZYRTEC PO) Take 1 tablet by mouth daily.       meloxicam (MOBIC) 15 MG tablet Take 15 mg by mouth daily (Patient not taking: Reported on 6/3/2021)      ibuprofen (ADVIL;MOTRIN) 200 MG tablet Take 200 mg by mouth every 6 hours as needed for Pain (Patient not taking: Reported on 8/11/2021)       No facility-administered encounter medications on file as of 8/11/2021. Current Outpatient Medications on File Prior to Visit   Medication Sig Dispense Refill    atorvastatin (LIPITOR) 20 MG tablet Take 1 tablet by mouth daily 90 tablet 3    Potassium Citrate ER 15 MEQ (1620 MG) TBCR Take 15 mEq by mouth 2 times daily (Patient taking differently: Take 15 mEq by mouth as needed ) 180 tablet 1    Vitamin D (CHOLECALCIFEROL) 1000 UNITS CAPS capsule Take 1,000 Units by mouth daily.  Cetirizine HCl (ZYRTEC PO) Take 1 tablet by mouth daily.  meloxicam (MOBIC) 15 MG tablet Take 15 mg by mouth daily (Patient not taking: Reported on 6/3/2021)      ibuprofen (ADVIL;MOTRIN) 200 MG tablet Take 200 mg by mouth every 6 hours as needed for Pain (Patient not taking: Reported on 8/11/2021)       No current facility-administered medications on file prior to visit. Pcn [penicillins]  Family History   Problem Relation Age of Onset   Zannie Cowden Cancer Sister         breast    Diabetes Sister     Stroke Mother     Diabetes Mother     Stroke Father     Diabetes Father      Social History     Tobacco Use   Smoking Status Never Smoker   Smokeless Tobacco Never Used       Social History     Substance and Sexual Activity   Alcohol Use Yes    Comment: ocassional       Review of Systems   Constitutional: Negative for appetite change, chills and fever. Eyes: Negative for redness and visual disturbance. Respiratory: Negative for apnea, cough, shortness of breath and wheezing. Cardiovascular: Negative for chest pain and leg swelling. Gastrointestinal: Negative for abdominal pain, constipation, nausea and vomiting. Genitourinary: Negative for difficulty urinating, dyspareunia, dysuria, enuresis, flank pain, frequency, hematuria, pelvic pain, urgency, vaginal bleeding and vaginal discharge. Positive for mixed incontinence   Musculoskeletal: Negative for back pain, joint swelling and myalgias. Skin: Negative for color change, rash and wound. Neurological: Negative for dizziness, tremors and numbness. Hematological: Negative for adenopathy. Does not bruise/bleed easily. Psychiatric/Behavioral: Negative for sleep disturbance. /78 (Site: Right Upper Arm, Position: Sitting, Cuff Size: Large Adult) Comment: manual  Temp 97.1 °F (36.2 °C) (Temporal)   Wt 230 lb (104.3 kg)   LMP  (LMP Unknown)   BMI 37.69 kg/m²       PHYSICAL EXAM:  Constitutional: Patient resting comfortably, in no acute distress. Neuro: Alert and oriented to person place and time. Psych: Mood and affect normal.  HEENT: normocephalic, atraumatic  Lymphatics: No palpable lymphadenopathy  Lungs: Respiratory effort normal, unlabored  Abdomen: Soft, non-tender, non-distended with no organomegaly or palpable masses. : No CVA tenderness. Pelvic: deferred     Lab Results   Component Value Date    BUN 16 05/26/2021     Lab Results   Component Value Date    CREATININE 0.75 05/26/2021       ASSESSMENT:   Diagnosis Orders   1. Kidney stones  XR ABDOMEN (KUB) (SINGLE AP VIEW)   2. Mixed incontinence         PLAN:  We will check her urine at next visit for hematuria check as she was unable to urinate in the office today. Discussed bladder irritants thoroughly. Patient instructed to avoid/minimize intake of food/drinks such as: coffee, tea, caffeine, alcohol, carbonated beverages, dark soda/pop, spicy/acidic foods. Was sent home with a extensive list, including non-irritating alternatives. Patient instructed to drink at least 80 ounces of \"good fluids\" daily (water, juice, Gatoraide, milk) and to avoid/minimize intake of \"bad fluids\" (soda pop, coffee, tea, alcohol, energy drinks). Also advised to avoid excessive consumption of sodium and animal protein to decrease risk of recurrent kidney stones.     We discussed risks (including worsening symptoms, infection, pain) and benefits of pelvic floor rehab to help with CHELE. We typically schedule PFR once per week for 6 weeks initially, then re-evaluate at that time. Sessions typically last one hour each week. We will check an explanation of benefits with patient's insurance company prior to starting therapy. She is going to try lifestyle changes prior to scheduling this    Recommended that she voids every 2-3 hours while awake    Kegel exercises    We will see her again in approximately 3 months to talk about her urinary symptoms. If she has not improved we will schedule her for pelvic floor rehabilitation        Patient will need a KUB in 1 year    Spent 30 mins, >50% time face-to-face, discussing diagnoses, any applicable test results, treatment plan/options, and prognosis.

## 2022-03-16 ENCOUNTER — TELEPHONE (OUTPATIENT)
Dept: UROLOGY | Age: 67
End: 2022-03-16

## 2022-03-16 NOTE — TELEPHONE ENCOUNTER
Please let her know that she could be having some ureteral or bladder spasm after passing the stone. We do recommend that she drinks at least 80 ounces of water a day and avoids caffeine and alcohol. Offer her an appointment to be seen this week if she remains uncomfortable. If she develops worsening pain fever or chills she is go to emergency room.

## 2022-03-16 NOTE — TELEPHONE ENCOUNTER
Patient called to report that she believes she passed a stone this AM but also feels she may have another one. Says she is getting pain in her left back and flank. Denies gross hematuria. Denies fever or vomiting but says she felt warm when passing the stone. Does have a cold and took Tylenol sinus.

## 2022-06-07 ENCOUNTER — HOSPITAL ENCOUNTER (OUTPATIENT)
Age: 67
Discharge: HOME | End: 2022-06-07
Payer: MEDICARE

## 2022-06-07 DIAGNOSIS — R73.01 IMPAIRED FASTING GLUCOSE: ICD-10-CM

## 2022-06-07 DIAGNOSIS — E78.2 MIXED HYPERLIPIDEMIA: ICD-10-CM

## 2022-06-07 LAB
ALT SERPL-CCNC: 52 U/L (ref 5–33)
ANION GAP SERPL CALCULATED.3IONS-SCNC: 10 MMOL/L (ref 9–17)
AST SERPL-CCNC: 31 U/L
BUN BLDV-MCNC: 15 MG/DL (ref 8–23)
BUN/CREAT BLD: 21 (ref 9–20)
CALCIUM SERPL-MCNC: 9.3 MG/DL (ref 8.6–10.4)
CHLORIDE BLD-SCNC: 106 MMOL/L (ref 98–107)
CHOLESTEROL/HDL RATIO: 3.8
CHOLESTEROL: 125 MG/DL
CO2: 24 MMOL/L (ref 20–31)
CREAT SERPL-MCNC: 0.71 MG/DL (ref 0.5–0.9)
ESTIMATED AVERAGE GLUCOSE: 143 MG/DL
GFR AFRICAN AMERICAN: >60 ML/MIN
GFR NON-AFRICAN AMERICAN: >60 ML/MIN
GFR SERPL CREATININE-BSD FRML MDRD: ABNORMAL ML/MIN/{1.73_M2}
GFR SERPL CREATININE-BSD FRML MDRD: ABNORMAL ML/MIN/{1.73_M2}
GLUCOSE BLD-MCNC: 129 MG/DL (ref 70–99)
HBA1C MFR BLD: 6.6 % (ref 4–6)
HCT VFR BLD CALC: 43.1 % (ref 36.3–47.1)
HDLC SERPL-MCNC: 33 MG/DL
HEMOGLOBIN: 14.3 G/DL (ref 11.9–15.1)
LDL CHOLESTEROL: 69 MG/DL (ref 0–130)
MCH RBC QN AUTO: 30.5 PG (ref 25.2–33.5)
MCHC RBC AUTO-ENTMCNC: 33.2 G/DL (ref 28.4–34.8)
MCV RBC AUTO: 91.9 FL (ref 82.6–102.9)
NRBC AUTOMATED: 0 PER 100 WBC
PDW BLD-RTO: 13.2 % (ref 11.8–14.4)
PLATELET # BLD: 238 K/UL (ref 138–453)
PMV BLD AUTO: 10.6 FL (ref 8.1–13.5)
POTASSIUM SERPL-SCNC: 3.6 MMOL/L (ref 3.7–5.3)
RBC # BLD: 4.69 M/UL (ref 3.95–5.11)
SODIUM BLD-SCNC: 140 MMOL/L (ref 135–144)
TRIGL SERPL-MCNC: 117 MG/DL
WBC # BLD: 5.6 K/UL (ref 3.5–11.3)

## 2022-06-07 PROCEDURE — 83036 HEMOGLOBIN GLYCOSYLATED A1C: CPT

## 2022-06-07 PROCEDURE — 80048 BASIC METABOLIC PNL TOTAL CA: CPT

## 2022-06-07 PROCEDURE — 84450 TRANSFERASE (AST) (SGOT): CPT

## 2022-06-07 PROCEDURE — 85027 COMPLETE CBC AUTOMATED: CPT

## 2022-06-07 PROCEDURE — 36415 COLL VENOUS BLD VENIPUNCTURE: CPT

## 2022-06-07 PROCEDURE — 84460 ALANINE AMINO (ALT) (SGPT): CPT

## 2022-06-07 PROCEDURE — 80061 LIPID PANEL: CPT

## 2022-08-25 ENCOUNTER — OFFICE VISIT (OUTPATIENT)
Dept: GASTROENTEROLOGY | Age: 67
End: 2022-08-25
Payer: MEDICARE

## 2022-08-25 VITALS
HEART RATE: 70 BPM | RESPIRATION RATE: 18 BRPM | WEIGHT: 224 LBS | BODY MASS INDEX: 37.28 KG/M2 | OXYGEN SATURATION: 96 %

## 2022-08-25 DIAGNOSIS — Z12.11 COLON CANCER SCREENING: Primary | ICD-10-CM

## 2022-08-25 DIAGNOSIS — E66.01 SEVERE OBESITY (BMI 35.0-39.9) WITH COMORBIDITY (HCC): ICD-10-CM

## 2022-08-25 PROCEDURE — 3017F COLORECTAL CA SCREEN DOC REV: CPT | Performed by: INTERNAL MEDICINE

## 2022-08-25 PROCEDURE — 1090F PRES/ABSN URINE INCON ASSESS: CPT | Performed by: INTERNAL MEDICINE

## 2022-08-25 PROCEDURE — G8417 CALC BMI ABV UP PARAM F/U: HCPCS | Performed by: INTERNAL MEDICINE

## 2022-08-25 PROCEDURE — G8400 PT W/DXA NO RESULTS DOC: HCPCS | Performed by: INTERNAL MEDICINE

## 2022-08-25 PROCEDURE — G8427 DOCREV CUR MEDS BY ELIG CLIN: HCPCS | Performed by: INTERNAL MEDICINE

## 2022-08-25 PROCEDURE — 1123F ACP DISCUSS/DSCN MKR DOCD: CPT | Performed by: INTERNAL MEDICINE

## 2022-08-25 PROCEDURE — 1036F TOBACCO NON-USER: CPT | Performed by: INTERNAL MEDICINE

## 2022-08-25 PROCEDURE — 99202 OFFICE O/P NEW SF 15 MIN: CPT | Performed by: INTERNAL MEDICINE

## 2022-08-25 RX ORDER — POLYETHYLENE GLYCOL 3350, SODIUM CHLORIDE, SODIUM BICARBONATE, POTASSIUM CHLORIDE 420; 11.2; 5.72; 1.48 G/4L; G/4L; G/4L; G/4L
4000 POWDER, FOR SOLUTION ORAL ONCE
Qty: 4000 ML | Refills: 0 | Status: SHIPPED | OUTPATIENT
Start: 2022-08-25 | End: 2022-08-25

## 2022-08-25 ASSESSMENT — ENCOUNTER SYMPTOMS
EYES NEGATIVE: 1
RESPIRATORY NEGATIVE: 1

## 2022-08-25 NOTE — PROGRESS NOTES
[address]    Chief Complaint   Patient presents with    Hemorrhoids     Patient has c/o internal hemorrhoids. She states that they occasionally bleed and are painful. HPI    Ms. Dequan Limon is a 69 year woman with a history of internal hemorrhoids, hyperlipidemia who presents for colon cancer screening. She states that it has been years since she had her last colonoscopy. She also reports that she has some bloating and left lower quadrant pain. She denies any history of acid reflux.      Family history of colon cancer: No  Blood in stool: No  Unintentional weight loss: No  Abdominal pain: No  Prior colonoscopy: Yes - 2013, 2003  Constipation history: No  Number of bowel movements a day: 1  Change in stool caliber: No      Colonoscopy 02/06/2013  Pre-operative Diagnosis: screening for colorectal cancer, history of colonic polyps     Post-operative Diagnosis: sessile polyp at 18 cm otherwise unremarkable endoscopy   Procedure: colonoscopy with hot snare polypectomy   Anesthesia: mac   Estimated Blood Loss: none   Complications: none   Specimens: were obtained    Past Medical History:   Diagnosis Date    Allergic rhinitis     Colon polyp     Hyperlipidemia     Impaired fasting glucose     Internal hemorrhoids     Kidney stones     Lump or mass in breast     right         Past Surgical History:   Procedure Laterality Date    BREAST BIOPSY Right 6/7/2013    [facility] - benign    BREAST SURGERY  2000    Right /cyst drained    CARPAL TUNNEL RELEASE Right     and trigger finger release    CARPAL TUNNEL RELEASE Right 8/21/2020    CARPAL TUNNEL RELEASE ENDOSCOPIC performed by Diamond Varela MD at [address] [facility]  8/09    COLONOSCOPY  2/6/2013    colon polyps    CYSTOSCOPY Left 5/2/2017    CYSTOSCOPY URETERAL STENT INSERTION performed by Ajit Marsh MD at [address] [facility] [address]  4/14/2009,3/20/2009,3/18/2009    FINGER TRIGGER RELEASE Right 8/21/2020    FINGER TRIGGER RELEASE--INDEX performed by Vida Bailey MD at [address]  3/20/2009    LITHOTRIPSY Left 5/2/2017    ESWL EXTRACORPEAL SHOCK WAVE LITHOTRIPSY performed by Marah Shah MD at [address] Left 05/09/2017    per Dr. Alison Gaming    LITHOTRIPSY Left 5/9/2017    ESWL EXTRACORPEAL SHOCK WAVE [facility]ITH[facility]RIPSY [facility] Right 8/21/2020    INJECTION Castillomouth performed by Vida Bailey MD at [address] OFFICE/OUTPT [facility] Left 6/28/2018    LEFT CYSTOSCOPY URETEROSCOPY LASER - [facility] performed by Marah Shah MD at [address] [facility]    PRE-MALIGNANT / [address] [facility]  7/09    posterior trunk    TONSILLECTOMY AND ADENOIDECTOMY           Current Outpatient Medications   Medication Sig Dispense Refill    atorvastatin (LIPITOR) 20 MG tablet TAKE 1 TABLET BY MOUTH EVERY DAY 90 tablet 3    Potassium Citrate ER 15 MEQ (1620 MG) TBCR Take 15 mEq by mouth 2 times daily (Patient taking differently: Take 15 mEq by mouth as needed) 180 tablet 1    Vitamin D (CHOLECALCIFEROL) 1000 UNITS CAPS capsule Take 1,000 Units by mouth daily. Cetirizine HCl (ZYRTEC PO) Take 1 tablet by mouth daily. No current facility-administered medications for this visit.          Family History   Problem Relation Age of Onset    Cancer Sister         breast    Diabetes Sister     Stroke Mother     Diabetes Mother     Stroke Father     Diabetes Father           Social Determinants of Health     Tobacco Use: Low Risk     Smoking Tobacco Use: Never    Smokeless Tobacco Use: Never   Alcohol Use: Not on file   Financial Resource Strain: Low Risk     Difficulty of Paying Living Expenses: Not hard at all   Food Insecurity: No Food Insecurity    Worried About Running Out of Food in the Last Year: Never true    Ran Out of Food in the Last Year: Never true   Transportation Needs: Not on file Physical Activity: Not on file   Stress: Not on file   Social Connections: Not on file   Intimate Partner Violence: Not on file   Depression: Not at risk    PHQ-2 Score: 0   Housing Stability: Not on file       Review of Systems   Constitutional: Negative. HENT: Negative. Eyes: Negative. Respiratory: Negative. Cardiovascular: Negative. Gastrointestinal:         Left lower quadrant cramps   Endocrine:        Hyperlipidemia   Genitourinary:         Kidney stones   Musculoskeletal: Negative. Skin: Negative. Neurological: Negative. Hematological: Negative. Psychiatric/Behavioral: Negative. Pulse 70   Resp 18   Wt 224 lb (101.6 kg)   LMP  (LMP Unknown)   SpO2 96%   BMI 37.28 kg/m²     Physical Exam  Constitutional:       Appearance: Normal appearance. HENT:      Head: Normocephalic. Nose: Nose normal.      Mouth/Throat:      Mouth: Mucous membranes are moist.   Eyes:      Pupils: Pupils are equal, round, and reactive to light. Cardiovascular:      Rate and Rhythm: Normal rate and regular rhythm. Pulses: Normal pulses. Heart sounds: Normal heart sounds. Pulmonary:      Effort: Pulmonary effort is normal.      Breath sounds: Normal breath sounds. Abdominal:      General: Bowel sounds are normal.      Palpations: Abdomen is soft. Musculoskeletal:         General: Normal range of motion. Cervical back: Normal range of motion. Skin:     General: Skin is warm. Neurological:      General: No focal deficit present. Mental Status: She is alert.    Psychiatric:         Mood and Affect: Mood normal.         Lab Results   Component Value Date    WBC 5.6 06/07/2022    HGB 14.3 06/07/2022    HCT 43.1 06/07/2022    MCV 91.9 06/07/2022     06/07/2022        Lab Results   Component Value Date     06/07/2022    K 3.6 (L) 06/07/2022     06/07/2022    CO2 24 06/07/2022    BUN 15 06/07/2022    CREATININE 0.71 06/07/2022    GLUCOSE 129 (H) 06/07/2022    CALCIUM 9.3 06/07/2022    PROT 7.3 06/26/2018    LABALBU 4.0 06/26/2018    BILITOT 1.14 06/26/2018    ALKPHOS 104 06/26/2018    AST 31 06/07/2022    ALT 52 (H) 06/07/2022    LABGLOM >60 06/07/2022    GFRAA >60 06/07/2022    GLOB NOT REPORTED 03/19/2014        No results found for: INR, PROTIME          Assessment      Ms. Denise Sheriff is a 69 year woman ASA 2, Mallampati score 2 with a history of internal hemorrhoids, hyperlipidemia who presents for colon cancer screening. She states that it has been years since she had her last colonoscopy. She also reports that she has some bloating and left lower quadrant pain. She denies any history of acid reflux. Plan      1. Colon cancer screening  - COLONOSCOPY (Screening); Future  - polyethylene glycol-electrolytes (NULYTELY) 420 g solution; Take 4,000 mLs by mouth once for 1 dose  Dispense: 4000 mL; Refill: 0    2. Additional recommendations based on findings    Informed consent was obtained with a discussion about potential risks and complications of the procedure. Patient verbalized understanding and willingness to continue with the procedure scheduling. Spent minutes with the patient with greater than 50 percent of the time was spent on face-to-face time in discussion with the patient regarding diagnostic options/results, treatment options, counseling, and follow-up plan.       Nathalie Marie MD

## 2022-08-25 NOTE — H&P (VIEW-ONLY)
[address]    Chief Complaint   Patient presents with    Hemorrhoids     Patient has c/o internal hemorrhoids. She states that they occasionally bleed and are painful. HPI    Ms. Clarisa Bo is a 69 year woman with a history of internal hemorrhoids, hyperlipidemia who presents for colon cancer screening. She states that it has been years since she had her last colonoscopy. She also reports that she has some bloating and left lower quadrant pain. She denies any history of acid reflux.      Family history of colon cancer: No  Blood in stool: No  Unintentional weight loss: No  Abdominal pain: No  Prior colonoscopy: Yes - 2013, 2003  Constipation history: No  Number of bowel movements a day: 1  Change in stool caliber: No      Colonoscopy 02/06/2013  Pre-operative Diagnosis: screening for colorectal cancer, history of colonic polyps     Post-operative Diagnosis: sessile polyp at 18 cm otherwise unremarkable endoscopy   Procedure: colonoscopy with hot snare polypectomy   Anesthesia: mac   Estimated Blood Loss: none   Complications: none   Specimens: were obtained    Past Medical History:   Diagnosis Date    Allergic rhinitis     Colon polyp     Hyperlipidemia     Impaired fasting glucose     Internal hemorrhoids     Kidney stones     Lump or mass in breast     right         Past Surgical History:   Procedure Laterality Date    BREAST BIOPSY Right 6/7/2013    [facility] - benign    BREAST SURGERY  2000    Right /cyst drained    CARPAL TUNNEL RELEASE Right     and trigger finger release    CARPAL TUNNEL RELEASE Right 8/21/2020    CARPAL TUNNEL RELEASE ENDOSCOPIC performed by Thad Thomas MD at [address] [address]  8/09    COLONOSCOPY  2/6/2013    colon polyps    CYSTOSCOPY Left 5/2/2017    CYSTOSCOPY URETERAL STENT INSERTION performed by Itz Alvarado MD at [address] [facility]  4/14/2009,3/20/2009,3/18/2009    FINGER TRIGGER RELEASE Right 8/21/2020    FINGER TRIGGER RELEASE--INDEX performed by Diamond Varela MD at [address]  3/20/2009    LITHOTRIPSY Left 5/2/2017    ESWL EXTRACORPEAL SHOCK WAVE LITHOTRIPSY performed by Ajit Marsh MD at [address] Left 05/09/2017    per Dr. Miguelito Pimentel    LITHOTRIPSY Left 5/9/2017    ESWL EXTRACORPEAL SHOCK WAVE LITHOTRIPSY performed by Ajit Marsh MD at [place] [address] Right 8/21/2020    INJECTION Castillomouth performed by Diamond Varela MD at [address] OFFICE/OUTPT [address] [facility] Left 6/28/2018    LEFT CYSTOSCOPY URETEROSCOPY LASER - [address] [facility] performed by Ajit Marsh MD at [address] [facility]    PRE-MALIGNANT / [address] [facility]  7/09    posterior trunk    TONSILLECTOMY AND ADENOIDECTOMY           Current Outpatient Medications   Medication Sig Dispense Refill    atorvastatin (LIPITOR) 20 MG tablet TAKE 1 TABLET BY MOUTH EVERY DAY 90 tablet 3    Potassium Citrate ER 15 MEQ (1620 MG) TBCR Take 15 mEq by mouth 2 times daily (Patient taking differently: Take 15 mEq by mouth as needed) 180 tablet 1    Vitamin D (CHOLECALCIFEROL) 1000 UNITS CAPS capsule Take 1,000 Units by mouth daily. Cetirizine HCl (ZYRTEC PO) Take 1 tablet by mouth daily. No current facility-administered medications for this visit.          Family History   Problem Relation Age of Onset    Cancer Sister         breast    Diabetes Sister     Stroke Mother     Diabetes Mother     Stroke Father     Diabetes Father           Social Determinants of Health     Tobacco Use: Low Risk     Smoking Tobacco Use: Never    Smokeless Tobacco Use: Never   Alcohol Use: Not on file   Financial Resource Strain: Low Risk     Difficulty of Paying Living Expenses: Not hard at all   Food Insecurity: No Food Insecurity    Worried About Running Out of Food in the Last Year: Never true    Ran Out of Food in the Last Year: Never true   Transportation Needs: Not on file Physical Activity: Not on file   Stress: Not on file   Social Connections: Not on file   Intimate Partner Violence: Not on file   Depression: Not at risk    PHQ-2 Score: 0   Housing Stability: Not on file       Review of Systems   Constitutional: Negative. HENT: Negative. Eyes: Negative. Respiratory: Negative. Cardiovascular: Negative. Gastrointestinal:         Left lower quadrant cramps   Endocrine:        Hyperlipidemia   Genitourinary:         Kidney stones   Musculoskeletal: Negative. Skin: Negative. Neurological: Negative. Hematological: Negative. Psychiatric/Behavioral: Negative. Pulse 70   Resp 18   Wt 224 lb (101.6 kg)   LMP  (LMP Unknown)   SpO2 96%   BMI 37.28 kg/m²     Physical Exam  Constitutional:       Appearance: Normal appearance. HENT:      Head: Normocephalic. Nose: Nose normal.      Mouth/Throat:      Mouth: Mucous membranes are moist.   Eyes:      Pupils: Pupils are equal, round, and reactive to light. Cardiovascular:      Rate and Rhythm: Normal rate and regular rhythm. Pulses: Normal pulses. Heart sounds: Normal heart sounds. Pulmonary:      Effort: Pulmonary effort is normal.      Breath sounds: Normal breath sounds. Abdominal:      General: Bowel sounds are normal.      Palpations: Abdomen is soft. Musculoskeletal:         General: Normal range of motion. Cervical back: Normal range of motion. Skin:     General: Skin is warm. Neurological:      General: No focal deficit present. Mental Status: She is alert.    Psychiatric:         Mood and Affect: Mood normal.         Lab Results   Component Value Date    WBC 5.6 06/07/2022    HGB 14.3 06/07/2022    HCT 43.1 06/07/2022    MCV 91.9 06/07/2022     06/07/2022        Lab Results   Component Value Date     06/07/2022    K 3.6 (L) 06/07/2022     06/07/2022    CO2 24 06/07/2022    BUN 15 06/07/2022    CREATININE 0.71 06/07/2022    GLUCOSE 129 (H)

## 2022-08-25 NOTE — PATIENT INSTRUCTIONS
SURVEY:    You may be receiving a survey from [employer] regarding your visit today. Please complete the survey to enable us to provide the highest quality of care to you and your family. If you cannot score us a very good on any question, please call the office to discuss how we could have made your experience a very good one. Thank you.   MD Maria Del Carmen Polanco, MD Kev Bailey, MD Linda Walls, MD Maicol Barnes, MD Bashir Mackay, Ivonne Kumar Coats, [facility], [facility] [place]  Glendy Gray, [address], [address] [place]  Sang Serrano, [address] [place]  Greg Ruano, [address] [facility] [place]

## 2022-08-26 DIAGNOSIS — Z01.818 PRE-OP TESTING: Primary | ICD-10-CM

## 2022-08-29 ENCOUNTER — TELEPHONE (OUTPATIENT)
Dept: GASTROENTEROLOGY | Age: 67
End: 2022-08-29

## 2022-08-29 NOTE — TELEPHONE ENCOUNTER
Called patient to advise her of the date of her colonoscopy as she wanted to be scheduled in [place]. She is scheduled 9/7 in [place].

## 2022-09-02 ENCOUNTER — HOSPITAL ENCOUNTER (OUTPATIENT)
Age: 67
Discharge: HOME | End: 2022-09-02

## 2022-09-02 DIAGNOSIS — Z01.818 PRE-OP TESTING: ICD-10-CM

## 2022-09-02 LAB
EKG ATRIAL RATE: 70 BPM
EKG P AXIS: 19 DEGREES
EKG P-R INTERVAL: 144 MS
EKG Q-T INTERVAL: 410 MS
EKG QRS DURATION: 90 MS
EKG QTC CALCULATION (BAZETT): 442 MS
EKG R AXIS: -23 DEGREES
EKG T AXIS: 36 DEGREES
EKG VENTRICULAR RATE: 70 BPM

## 2022-09-06 ENCOUNTER — ANESTHESIA EVENT (OUTPATIENT)
Dept: OPERATING ROOM | Age: 67
End: 2022-09-06
Payer: MEDICARE

## 2022-09-07 ENCOUNTER — ANESTHESIA (OUTPATIENT)
Dept: OPERATING ROOM | Age: 67
End: 2022-09-07
Payer: MEDICARE

## 2022-09-07 ENCOUNTER — HOSPITAL ENCOUNTER (OUTPATIENT)
Age: 67
Setting detail: OUTPATIENT SURGERY
Discharge: HOME | End: 2022-09-07
Attending: INTERNAL MEDICINE | Admitting: INTERNAL MEDICINE
Payer: MEDICARE

## 2022-09-07 VITALS
DIASTOLIC BLOOD PRESSURE: 77 MMHG | HEIGHT: 66 IN | OXYGEN SATURATION: 97 % | SYSTOLIC BLOOD PRESSURE: 155 MMHG | BODY MASS INDEX: 35.52 KG/M2 | TEMPERATURE: 97.8 F | RESPIRATION RATE: 16 BRPM | HEART RATE: 73 BPM | WEIGHT: 221 LBS

## 2022-09-07 DIAGNOSIS — Z12.11 COLON CANCER SCREENING: ICD-10-CM

## 2022-09-07 PROCEDURE — 45385 COLONOSCOPY W/LESION REMOVAL: CPT | Performed by: INTERNAL MEDICINE

## 2022-09-07 PROCEDURE — 2500000003 HC RX 250 WO HCPCS: Performed by: NURSE ANESTHETIST, CERTIFIED REGISTERED

## 2022-09-07 PROCEDURE — 3609010600 HC COLONOSCOPY POLYPECTOMY SNARE/COLD BIOPSY: Performed by: INTERNAL MEDICINE

## 2022-09-07 PROCEDURE — 7100000011 HC PHASE II RECOVERY - ADDTL 15 MIN: Performed by: INTERNAL MEDICINE

## 2022-09-07 PROCEDURE — 6360000002 HC RX W HCPCS: Performed by: NURSE ANESTHETIST, CERTIFIED REGISTERED

## 2022-09-07 PROCEDURE — 3700000000 HC ANESTHESIA ATTENDED CARE: Performed by: INTERNAL MEDICINE

## 2022-09-07 PROCEDURE — 45380 COLONOSCOPY AND BIOPSY: CPT | Performed by: INTERNAL MEDICINE

## 2022-09-07 PROCEDURE — 2580000003 HC RX 258: Performed by: NURSE ANESTHETIST, CERTIFIED REGISTERED

## 2022-09-07 PROCEDURE — 88305 TISSUE EXAM BY PATHOLOGIST: CPT

## 2022-09-07 PROCEDURE — 3700000001 HC ADD 15 MINUTES (ANESTHESIA): Performed by: INTERNAL MEDICINE

## 2022-09-07 PROCEDURE — 7100000010 HC PHASE II RECOVERY - FIRST 15 MIN: Performed by: INTERNAL MEDICINE

## 2022-09-07 PROCEDURE — 2709999900 HC NON-CHARGEABLE SUPPLY: Performed by: INTERNAL MEDICINE

## 2022-09-07 RX ORDER — LIDOCAINE HYDROCHLORIDE 20 MG/ML
INJECTION, SOLUTION INFILTRATION; PERINEURAL PRN
Status: DISCONTINUED | OUTPATIENT
Start: 2022-09-07 | End: 2022-09-07 | Stop reason: SDUPTHER

## 2022-09-07 RX ORDER — SODIUM CHLORIDE, SODIUM LACTATE, POTASSIUM CHLORIDE, CALCIUM CHLORIDE 600; 310; 30; 20 MG/100ML; MG/100ML; MG/100ML; MG/100ML
INJECTION, SOLUTION INTRAVENOUS CONTINUOUS
Status: DISCONTINUED | OUTPATIENT
Start: 2022-09-07 | End: 2022-09-07 | Stop reason: HOSPADM

## 2022-09-07 RX ORDER — PROPOFOL 10 MG/ML
INJECTION, EMULSION INTRAVENOUS PRN
Status: DISCONTINUED | OUTPATIENT
Start: 2022-09-07 | End: 2022-09-07 | Stop reason: SDUPTHER

## 2022-09-07 RX ADMIN — LIDOCAINE HYDROCHLORIDE 60 MG: 20 INJECTION, SOLUTION INFILTRATION; PERINEURAL at 12:28

## 2022-09-07 RX ADMIN — SODIUM CHLORIDE, POTASSIUM CHLORIDE, SODIUM LACTATE AND CALCIUM CHLORIDE: 600; 310; 30; 20 INJECTION, SOLUTION INTRAVENOUS at 11:12

## 2022-09-07 RX ADMIN — PROPOFOL 60 MG: 10 INJECTION, EMULSION INTRAVENOUS at 12:28

## 2022-09-07 RX ADMIN — PROPOFOL 200 MCG/KG/MIN: 10 INJECTION, EMULSION INTRAVENOUS at 12:29

## 2022-09-07 ASSESSMENT — PAIN - FUNCTIONAL ASSESSMENT: PAIN_FUNCTIONAL_ASSESSMENT: NONE - DENIES PAIN

## 2022-09-07 NOTE — DISCHARGE INSTRUCTIONS
SAME DAY SURGERY DISCHARGE INSTRUCTIONS    1. Do not drive or operate hazardous machinery for 24 hours. 2.  Do not make important personal or business decisions for 24 hours. 3.  Do not drink alcoholic beverages for 24 hours. 4.  Do not smoke tobacco products for 24 hours. 5.  Eat light foods (Jell-O, soups, etc....) and drink plenty of fluids (water, Sprite, etc...) up to 8 glasses per day, as you can tolerate. 6.  Limit your activities for 24 hours. Do not engage in heavy work until your surgeon gives you permission. 7.  Call your surgeon for any questions regarding your surgery. COLONOSCOPY DISCHARGE INSTRUCTIONS:    It's normal to have a feeling of fullness or mild cramping in your abdomen afterwards due to air that is put into your bowel during the procedure. Mild activities such as walking will help you pass the air. You may resume your regular diet. You will receive a phone call with your test results in 2 weeks. If you have not received a phone call in 2 weeks please call the office for your results. CALL THE DOCTOR IF YOU HAVE:     Chest pain or trouble breathing. Bleeding from your rectum, vomiting or spitting up of blood that is more than a few streaks or red or black stools    A fever above 101F or if you have chills    Pain that is worse or different than any pain you had before the procedure    Nausea or vomiting that lasts for more than 2 hours. If symptoms are to severe call 911 or go to the nearest Emergency Room.

## 2022-09-07 NOTE — PROGRESS NOTES
Pt verbalized readiness to go home. Discharge instructions given to pt and sister-in-law. Verbalized understanding and all questions answered at this time. Discharge Criteria    Inpatients must meet Criteria 1 through 7. All other patients are either YES or N/A. If a NO is chosen then Anesthesia or Surgeon must be notified. 1.  Minimum 30 minutes after last dose of sedative medication, minimum 120 minutes after last dose of reversal agent. Yes      2. Systolic BP stable within 20 mmHg for 30 minutes & systolic BP between 90 & 355 or within 10 mmHg of baseline. Yes      3. Pulse between 60 and 100 or within 10 bpm of baseline. Yes      4. Spontaneous respiratory rate >/= 10 per minute. Yes      5. SaO2 >/= 95 or  >/= baseline. Yes      6. Able to cough and swallow or return to baseline function. Yes      7. Alert and oriented or return to baseline mental status. Yes      8. Demonstrates controlled, coordinated movements, ambulates with steady gait, or return to baseline activity function. Yes      9. Minimal or no pain or nausea, or at a level tolerable and acceptable to patient. Yes      10. Takes and retains oral fluids as allowed. Yes      11. Procedural / perioperative site stable. Minimal or no bleeding. Yes          12. If GI endoscopy procedure, minimal or no abdominal distention or passing flatus. Yes      13. Written discharge instructions and emergency telephone number provided. Yes      14. Accompanied by a responsible adult.     Yes

## 2022-09-07 NOTE — ANESTHESIA POSTPROCEDURE EVALUATION
Department of Anesthesiology  Postprocedure Note    Patient: Bennie Garcia  MRN: [id number]  YOB: 1955  Date of evaluation: 9/7/2022      Procedure Summary     Date: 09/07/22 Room / Location: [address] [place] [address]    Anesthesia Start: 6063 Anesthesia Stop: 1301    Procedure: COLONOSCOPY POLYPECTOMY SNARE/COLD BIOPSY Diagnosis:       Colon cancer screening      (SCREENING)    Surgeons: Maya Matos MD Responsible Provider: Merna Oconnor. JULIET Soto CRNA    Anesthesia Type: general, TIVA ASA Status: 2          Anesthesia Type: No value filed.     Madai Phase I:      Madai Phase II: Madai Score: 10      Anesthesia Post Evaluation    Patient location during evaluation: PACU  Patient participation: complete - patient participated  Level of consciousness: awake and alert  Pain score: 0  Airway patency: patent  Nausea & Vomiting: no vomiting and no nausea  Complications: no  Cardiovascular status: blood pressure returned to baseline  Respiratory status: acceptable  Hydration status: stable

## 2022-09-07 NOTE — OP NOTE
[facility]    COLONOSCOPY    PROCEDURE DATE: 09/07/22    REFERRING PHYSICIAN: No ref. provider found     PRIMARY CARE PROVIDER: Carrillo Potter MD    ATTENDING PHYSICIAN: Mario Rizvi MD     HISTORY: Ms. Jose Carbajal is a 69 y.o. female who presents to the  endoscopy unit for colonoscopy. The patient's clinical history is remarkable for hyperlipidemia, fatty liver disease. She is currently medically stable and appropriate for the planned procedure. PREOPERATIVE DIAGNOSIS: rectal bleeding. PROCEDURES:   Transanal Colonoscopy -- Diagnostic. POSTPROCEDURE DIAGNOSIS:  Moderate internal hemorrhoids  Colon Polyps    MEDICATIONS: MAC per anesthesia     EBL: 2 ml      INSTRUMENT: Olympus CF-H190 AL Pediatric flexible Colonoscope. PREPARATION: The nature and character of the procedure as well as risks, benefits, and alternatives were discussed with the patient and informed consent was obtained. Complications were said to include, but were not limited to: medication allergy, medication reaction, cardiovascular and respiratory problems, bleeding, perforation, infection, and/or missed diagnosis. Following arrival in the endoscopy room, the patient was placed in the left lateral decubitus position and final time-out accomplished in the presence of the nursing staff. Baseline vital signs were obtained and reviewed, and IV sedation was subsequently initiated. FINDINGS: Rectal examination demonstrated no significant visible external abnormality and digital palpation was unremarkable. Following adequate conscious sedation the colonoscope was introduced and advanced under direct visualization to the terminal ileum. The bowel preparation was felt to be fair - visualization improved with scope irrigation. Cecal intubation time was  4 minutes. Once maximally inserted, the endoscope was withdrawn and the mucosa was carefully inspected.  The mucosal exam revealed 8 sessile polyps and moderate non-bleeding internal hemorrhoids on retroflex. Rectum: 8mm sessile polyp removed from rectum with cold snare. 5mm, 5mm sessile polyp removed from rectum with cold biopsy forceps. Descending colon: 7mm, 6mm, 7mm sessile polyp removed with cold snare  Ascending colon:5mm removed with cold biopsy forceps  Cecum: 7mm sessile polyp removed with a cold snare. Retroflexion was performed in the rectum and moderate internal hemorrhoids noted. Withdrawal time was 19 minutes. IMPRESSION:   Colon polyps  Internal hemorrhoids     RECOMMENDATIONS:   1) Follow up with referring provider, as previously scheduled. 2) Repeat Colonoscopy based on pathology   3 Recommend Sitz baths and Anusol rectal suppository. Electronically signed by Tyrone Foote MD on 9/7/2022 at 1:07 PM       The patient was counseled at length about the risks of judith Covid-19 during their perioperative period and any recovery window from their procedure. The patient was made aware that judith Covid-19  may worsen their prognosis for recovering from their procedure  and lend to a higher morbidity and/or mortality risk. All material risks, benefits, and reasonable alternatives including postponing the procedure were discussed. The patient DOES wish to proceed with the procedure at this time.

## 2022-09-07 NOTE — ANESTHESIA PRE PROCEDURE
Department of Anesthesiology  Preprocedure Note       Name:  Pancho Estrada   Age:  69 y.o.  :  1955                                          MRN:  [id number]         Date:  2022      Surgeon: Rika Hendricks):  Shekhar Guo MD    Procedure: Procedure(s):  COLORECTAL CANCER SCREENING, NOT HIGH RISK    Medications prior to admission:   Prior to Admission medications    Medication Sig Start Date End Date Taking? Authorizing Provider   atorvastatin (LIPITOR) 20 MG tablet TAKE 1 TABLET BY MOUTH EVERY DAY 21   Lance Johnson MD   Potassium Citrate ER 15 MEQ (1620 MG) TBCR Take 15 mEq by mouth 2 times daily  Patient taking differently: Take 15 mEq by mouth as needed 19   JULIET Taveras CNP   Vitamin D (CHOLECALCIFEROL) 1000 UNITS CAPS capsule Take 1,000 Units by mouth daily. Historical Provider, MD   Cetirizine HCl (ZYRTEC PO) Take 1 tablet by mouth daily. Historical Provider, MD       Current medications:    Current Facility-Administered Medications   Medication Dose Route Frequency Provider Last Rate Last Admin    lactated ringers infusion   IntraVENous Continuous JULIET Bazzi - CRNA 100 mL/hr at 22 1112 New Bag at 22 1112       Allergies:     Allergies   Allergen Reactions    Pcn [Penicillins]      AS A CHILD       Problem List:    Patient Active Problem List   Diagnosis Code    History of colonic polyps Z86.010    NAFLD (nonalcoholic fatty liver disease) K76.0    Hyperlipidemia E78.5    Impaired fasting glucose R73.01    Kidney stones N20.0    Renal stones N20.0    Hematuria R31.9    Right carpal tunnel syndrome G56.01       Past Medical History:        Diagnosis Date    Allergic rhinitis     Colon polyp     Hyperlipidemia     Impaired fasting glucose     Internal hemorrhoids     Kidney stones     Lump or mass in breast     right       Past Surgical History:        Procedure Laterality Date    BREAST BIOPSY Right 2013    [facility] in [place] - benign    BREAST SURGERY  2000    Right /cyst drained    CARPAL TUNNEL RELEASE Right     and trigger finger release    CARPAL TUNNEL RELEASE Right 8/21/2020    CARPAL TUNNEL RELEASE ENDOSCOPIC performed by Daljit Hernandez MD at [facility]  8/09    COLONOSCOPY  2/6/2013    colon polyps    CYSTOSCOPY Left 5/2/2017    CYSTOSCOPY URETERAL STENT INSERTION performed by Bina Shepherd MD at [facility]  4/14/2009,3/20/2009,3/18/2009    FINGER TRIGGER RELEASE Right 8/21/2020    FINGER TRIGGER RELEASE--INDEX performed by Daljit Hernandez MD at [facility] [facility] LITHOTRIPSY  3/20/2009    LITHOTRIPSY Left 5/2/2017    ESWL EXTRACORPEAL SHOCK WAVE LITHOTRIPSY performed by Bina Shepherd MD at [facility] LITHOTRIPSY Left 05/09/2017    per Dr. Brittani Warren LITHOTRIPSY Left 5/9/2017    ESWL [address] LITHOTRIPSY performed by Bina Shepherd MD at [address] Right 8/21/2020    INJECTION [place] performed by Daljit Hernandez MD at [address] OFFICE/OUTPT [address] Left 6/28/2018    LEFT CYSTOSCOPY URETEROSCOPY LASER - [address] performed by Bina Shepherd MD at [facility] PRE-MALIGNANT / [address]  7/09    posterior trunk    TONSILLECTOMY AND ADENOIDECTOMY         Social History:    Social History     Tobacco Use    Smoking status: Never    Smokeless tobacco: Never   Substance Use Topics    Alcohol use: Yes     Comment: ocassional                                Counseling given: Not Answered      Vital Signs (Current):   Vitals:    08/26/22 1545 09/07/22 1107 09/07/22 1112   BP:  (!) 181/79 (!) 168/78   Pulse:  81 78   Resp:  18    Temp:  36.7 °C (98.1 °F)    TempSrc:  Temporal    SpO2:  94%    Weight: 224 lb (101.6 kg) 221 lb (100.2 kg)    Height: 5' 5\" (1.651 m) 5' 5.5\" (1.664 m)                                               BP Readings from Last 3 Encounters:   09/07/22 (!) 168/78   06/08/22 126/83   12/06/21 118/76       NPO Status: Time of last liquid consumption: 0600                        Time of last solid consumption: 1830                        Date of last liquid consumption: 09/07/22                        Date of last solid food consumption: 09/05/22    BMI:   Wt Readings from Last 3 Encounters:   09/07/22 221 lb (100.2 kg)   08/25/22 224 lb (101.6 kg)   06/08/22 229 lb (103.9 kg)     Body mass index is 36.22 kg/m². CBC:   Lab Results   Component Value Date/Time    WBC 5.6 06/07/2022 08:37 AM    RBC 4.69 06/07/2022 08:37 AM    RBC 4.90 11/30/2021 09:50 AM    HGB 14.3 06/07/2022 08:37 AM    HCT 43.1 06/07/2022 08:37 AM    MCV 91.9 06/07/2022 08:37 AM    RDW 13.2 06/07/2022 08:37 AM     06/07/2022 08:37 AM       CMP:   Lab Results   Component Value Date/Time     06/07/2022 08:37 AM    K 3.6 06/07/2022 08:37 AM     06/07/2022 08:37 AM    CO2 24 06/07/2022 08:37 AM    BUN 15 06/07/2022 08:37 AM    CREATININE 0.71 06/07/2022 08:37 AM    GFRAA >60 06/07/2022 08:37 AM    LABGLOM >60 06/07/2022 08:37 AM    GLUCOSE 129 06/07/2022 08:37 AM    GLUCOSE 127 11/30/2021 09:50 AM    PROT 7.3 06/26/2018 06:15 AM    CALCIUM 9.3 06/07/2022 08:37 AM    BILITOT 1.14 06/26/2018 06:15 AM    ALKPHOS 104 06/26/2018 06:15 AM    AST 31 06/07/2022 08:37 AM    ALT 52 06/07/2022 08:37 AM       POC Tests: No results for input(s): POCGLU, POCNA, POCK, POCCL, POCBUN, POCHEMO, POCHCT in the last 72 hours.     Coags: No results found for: PROTIME, INR, APTT    HCG (If Applicable): No results found for: PREGTESTUR, PREGSERUM, HCG, HCGQUANT     ABGs: No results found for: PHART, PO2ART, HMW7QRP, FKJ4QRK, BEART, G9PNKFBC     Type & Screen (If Applicable):  No results found for: LABABO, LABRH    Drug/Infectious Status (If Applicable):  Lab Results   Component Value Date/Time    HEPCAB NONREACTIVE 03/19/2014 09:33 AM       COVID-19 Screening (If Applicable):   Lab Results   Component Value Date/Time    COVID19 Not Detected 08/14/2020 09:56 AM           Anesthesia Evaluation   no history of anesthetic complications:   Airway: Mallampati: IV  TM distance: <3 FB   Neck ROM: full  Mouth opening: < 3 FB and > = 3 FB   Dental:          Pulmonary:Negative Pulmonary ROS and normal exam  breath sounds clear to auscultation                             Cardiovascular:  Exercise tolerance: good (>4 METS),   (+) hyperlipidemia      ECG reviewed                        Neuro/Psych:   Negative Neuro/Psych ROS              GI/Hepatic/Renal:   (+) liver disease (NAFLD):, bowel prep, morbid obesity          Endo/Other: Negative Endo/Other ROS                    Abdominal:   (+) obese,           Vascular: negative vascular ROS. Other Findings:           Anesthesia Plan      general and TIVA     ASA 2       Induction: intravenous. Anesthetic plan and risks discussed with patient.                         JULIET Lauren - CRNA   9/7/2022

## 2022-09-07 NOTE — INTERVAL H&P NOTE
Update History & Physical    The patient's History and Physical of August 25, 2022 was reviewed with the patient and I examined the patient. There was no change. The surgical site was confirmed by the patient and me. Plan: The risks, benefits, expected outcome, and alternative to the recommended procedure have been discussed with the patient. Patient understands and wants to proceed with the procedure.      Electronically signed by Tyrone Foote MD on 9/7/2022 at 12:18 PM

## 2022-09-09 LAB — SURGICAL PATHOLOGY REPORT: NORMAL

## 2023-08-22 ENCOUNTER — HOSPITAL ENCOUNTER (OUTPATIENT)
Dept: WOMENS IMAGING | Age: 68
Discharge: HOME | End: 2023-08-24
Payer: MEDICARE

## 2023-08-22 VITALS — WEIGHT: 227 LBS | HEIGHT: 66 IN | BODY MASS INDEX: 36.48 KG/M2

## 2023-08-22 DIAGNOSIS — Z12.31 ENCOUNTER FOR SCREENING MAMMOGRAM FOR MALIGNANT NEOPLASM OF BREAST: ICD-10-CM

## 2023-08-22 DIAGNOSIS — Z78.0 POST-MENOPAUSAL: ICD-10-CM

## 2023-08-22 PROCEDURE — 77063 BREAST TOMOSYNTHESIS BI: CPT

## 2023-08-22 PROCEDURE — 77080 DXA BONE DENSITY AXIAL: CPT

## 2023-08-29 ENCOUNTER — HOSPITAL ENCOUNTER (OUTPATIENT)
Dept: ULTRASOUND IMAGING | Age: 68
Discharge: HOME | End: 2023-08-31
Payer: MEDICARE

## 2023-08-29 ENCOUNTER — HOSPITAL ENCOUNTER (OUTPATIENT)
Dept: WOMENS IMAGING | Age: 68
Discharge: HOME | End: 2023-08-31
Payer: MEDICARE

## 2023-08-29 DIAGNOSIS — R92.8 ABNORMAL FINDING ON BREAST IMAGING: ICD-10-CM

## 2023-08-29 PROCEDURE — G0279 TOMOSYNTHESIS, MAMMO: HCPCS

## 2024-01-22 ENCOUNTER — OFFICE VISIT (OUTPATIENT)
Dept: UROLOGY | Age: 69
End: 2024-01-22
Payer: MEDICARE

## 2024-01-22 ENCOUNTER — HOSPITAL ENCOUNTER (OUTPATIENT)
Dept: GENERAL RADIOLOGY | Age: 69
Discharge: HOME | End: 2024-01-24
Attending: UROLOGY
Payer: MEDICARE

## 2024-01-22 ENCOUNTER — HOSPITAL ENCOUNTER (OUTPATIENT)
Age: 69
Discharge: HOME | End: 2024-01-24
Payer: MEDICARE

## 2024-01-22 VITALS
HEIGHT: 65 IN | BODY MASS INDEX: 38.35 KG/M2 | WEIGHT: 230.2 LBS | TEMPERATURE: 97.7 F | SYSTOLIC BLOOD PRESSURE: 134 MMHG | HEART RATE: 75 BPM | DIASTOLIC BLOOD PRESSURE: 83 MMHG

## 2024-01-22 DIAGNOSIS — N20.0 RENAL CALCULUS: ICD-10-CM

## 2024-01-22 DIAGNOSIS — N39.3 FEMALE STRESS INCONTINENCE: Primary | ICD-10-CM

## 2024-01-22 PROCEDURE — 74018 RADEX ABDOMEN 1 VIEW: CPT

## 2024-01-22 PROCEDURE — 99214 OFFICE O/P EST MOD 30 MIN: CPT | Performed by: UROLOGY

## 2024-01-22 PROCEDURE — 1090F PRES/ABSN URINE INCON ASSESS: CPT | Performed by: UROLOGY

## 2024-01-22 PROCEDURE — 3017F COLORECTAL CA SCREEN DOC REV: CPT | Performed by: UROLOGY

## 2024-01-22 PROCEDURE — G8484 FLU IMMUNIZE NO ADMIN: HCPCS | Performed by: UROLOGY

## 2024-01-22 PROCEDURE — 0509F URINE INCON PLAN DOCD: CPT | Performed by: UROLOGY

## 2024-01-22 PROCEDURE — 1123F ACP DISCUSS/DSCN MKR DOCD: CPT | Performed by: UROLOGY

## 2024-01-22 PROCEDURE — PBSHW PR MEAS POST-VOIDING RESIDUAL URINE&/BLADDER CAP: Performed by: UROLOGY

## 2024-01-22 PROCEDURE — G8399 PT W/DXA RESULTS DOCUMENT: HCPCS | Performed by: UROLOGY

## 2024-01-22 PROCEDURE — G8427 DOCREV CUR MEDS BY ELIG CLIN: HCPCS | Performed by: UROLOGY

## 2024-01-22 PROCEDURE — 1036F TOBACCO NON-USER: CPT | Performed by: UROLOGY

## 2024-01-22 PROCEDURE — G8417 CALC BMI ABV UP PARAM F/U: HCPCS | Performed by: UROLOGY

## 2024-01-22 PROCEDURE — 51798 US URINE CAPACITY MEASURE: CPT | Performed by: UROLOGY

## 2024-01-22 RX ORDER — MELOXICAM 15 MG/1
15 TABLET ORAL DAILY
COMMUNITY
Start: 2024-01-03 | End: 2024-02-02

## 2024-01-22 ASSESSMENT — ENCOUNTER SYMPTOMS
BACK PAIN: 0
EYE REDNESS: 0
COUGH: 0
VOMITING: 0
WHEEZING: 0
SHORTNESS OF BREATH: 0
COLOR CHANGE: 0
ABDOMINAL PAIN: 0
NAUSEA: 0
CONSTIPATION: 0
APNEA: 0

## 2024-01-22 NOTE — PROGRESS NOTES
HPI:        Patient is a 68 y.o. female in no acute distress.  She is alert and oriented to person, place, and time.        History  Previous patient of Dr. Bangura.     3/2009 left ESWL for 16x8mm and 6x6mm stones. 100% uric acid stones. 7/2012 on urocit-k 15 mEq BID and allopurinol 300mg daily     3/2017 Referred by PCP due to microhematuria, UA showed 20-50 RBCs, it also showed uric acid crystals. Denies any gross hematuria.  CT urogram showed a 25 x 20 mm stone in the lower pole the left kidney.       5/2/2017 and 5/9/2017 staged left ESWL     6/28/2018 left HLL     Currently  Patient is here today for follow-up.  Patient has not been seen in the office for approximately 3 years.  Patient is currently having leakage with coughing and sneezing.  She does state that this is worse when her bladder is full or she is intaking soda.  She does have a daily bowel movement.  She is wearing padded underwear instead of pads.  Patient is concerned about this.  Patient has not had formal stone follow-up.  She has not had a KUB since 2020.  Patient does state that she has passed some small stones since then.  No pain today    Past Medical History:   Diagnosis Date    Allergic rhinitis     Colon polyp     Hyperlipidemia     Impaired fasting glucose     Internal hemorrhoids     Kidney stones     Lump or mass in breast     right     Past Surgical History:   Procedure Laterality Date    BREAST BIOPSY Right 06/07/2013    [facility] in [place] - benign    BREAST SURGERY  2000    Right /cyst drained    CARPAL TUNNEL RELEASE Right     and trigger finger release    CARPAL TUNNEL RELEASE Right 08/21/2020    CARPAL TUNNEL RELEASE ENDOSCOPIC performed by Francisco Pal MD at [facility] OR    COLONOSCOPY  08/2009    COLONOSCOPY  02/06/2013    colon polyps    COLONOSCOPY N/A 09/07/2022    COLONOSCOPY POLYPECTOMY SNARE/COLD BIOPSY performed by Tegan Farnsworth MD at [facility] OR    COLONOSCOPY W/ POLYPECTOMY N/A 09/07/2022    Dr Farnsworth-polyps(tubular

## 2024-01-30 ENCOUNTER — PROCEDURE VISIT (OUTPATIENT)
Dept: UROLOGY | Age: 69
End: 2024-01-30
Payer: MEDICARE

## 2024-01-30 VITALS
TEMPERATURE: 97.1 F | SYSTOLIC BLOOD PRESSURE: 130 MMHG | BODY MASS INDEX: 38.85 KG/M2 | HEART RATE: 77 BPM | HEIGHT: 65 IN | WEIGHT: 233.2 LBS | DIASTOLIC BLOOD PRESSURE: 75 MMHG

## 2024-01-30 DIAGNOSIS — N39.3 FEMALE STRESS INCONTINENCE: Primary | ICD-10-CM

## 2024-01-30 DIAGNOSIS — M62.81 MUSCLE WEAKNESS: ICD-10-CM

## 2024-01-30 PROCEDURE — 97750 PHYSICAL PERFORMANCE TEST: CPT | Performed by: PHYSICIAN ASSISTANT

## 2024-01-30 PROCEDURE — 91122 PR ANORECTAL MANOMETRY: CPT | Performed by: PHYSICIAN ASSISTANT

## 2024-01-30 PROCEDURE — 97032 APPL MODALITY 1+ESTIM EA 15: CPT | Performed by: PHYSICIAN ASSISTANT

## 2024-01-30 PROCEDURE — 51784 ANAL/URINARY MUSCLE STUDY: CPT | Performed by: PHYSICIAN ASSISTANT

## 2024-01-30 NOTE — PROGRESS NOTES
INITIAL PELVIC FLOOR REHAB INTAKE    Symptoms  Daytime voiding frequency: this is not consistent  Nighttime voiding frequency: 2-3x  Urgency: moderate  Incontinence episodes per day: leaking depending on what she drinks, Causes: bending, coughing, lifting, walking, sneezing, with urge, with a full bladder  Number of pads used per day: knix underwear somtiomes changing 3-4 times some days she can wear one pair all day  Pelvic pain: no  Bowel habits: normal, Soft, Small, Blood(hemmroid) , Fecal incontinence: no  Pain with intercourse: yes she did but she states she is not having sex    Pertinent History  Previous pelvic surgery: no  Number vaginal deliveries: 3 Episiotomy: Yes  Number c-sections: 0  Urology medications/diuretics: no    Intake of spicy/citrus/tomato based foods: occasional  Caffeine intake per day: one bottle of soda about 12oz  Fluid intake per day: she is trying to work on increase water intake. 20oz of water daily  she is trying to eliminate Dr Pepper and Coke  Alcohol intake: occasional  Smoking: No    Contraindications  Pacemaker: No  Pregnant/trying to conceive: No   Unstable seizure disorder: No  Active UTI: no    Was able to hold for 4-5 seconds at low wavy amplitude.   Fatigue after 5 repetitions.     Home exercise regimen prescribed:   Repetitions - 5   Contract - 5 sec   Relax - 10 sec   + 10 Quick Flicks  Frequency- 4 times daily    Stimulated with 31 average mA for 8 minutes.

## 2024-02-02 ENCOUNTER — HOSPITAL ENCOUNTER (OUTPATIENT)
Age: 69
Setting detail: SPECIMEN
Discharge: HOME | End: 2024-02-02

## 2024-02-02 ENCOUNTER — OFFICE VISIT (OUTPATIENT)
Dept: SURGERY | Age: 69
End: 2024-02-02

## 2024-02-02 VITALS
SYSTOLIC BLOOD PRESSURE: 138 MMHG | BODY MASS INDEX: 38.49 KG/M2 | DIASTOLIC BLOOD PRESSURE: 82 MMHG | WEIGHT: 232 LBS | HEART RATE: 74 BPM

## 2024-02-02 DIAGNOSIS — D17.22 LIPOMA OF LEFT UPPER EXTREMITY: ICD-10-CM

## 2024-02-02 DIAGNOSIS — E88.2 LIPOMATOSIS: Primary | ICD-10-CM

## 2024-02-02 DIAGNOSIS — E66.01 SEVERE OBESITY (BMI 35.0-39.9) WITH COMORBIDITY (HCC): ICD-10-CM

## 2024-02-02 NOTE — PROGRESS NOTES
Patient has multiple subcutaneous lumps, but there are 2 which are causing her pain.  She has never had any removed before.  She does not know if either of her parents has this problem    The first is on her left forearm, posterior, several inches proximal to her wrist  On palpation there are 2 separate lumps in the area, but only one bothers her. It is about 2 cm.  It  is discrete, soft to rubbery, mobile and minimally tender.      The second on the lateral surface of her arm about 1/2 the way between her elbow and shoulder. It to is discrete, mobile, minimally tender, soft to rubbery, 2 cm across.    She has many others that are not symptomatic.  Clearly has lipomatosis.    She would like the symptomatic lumps removed.     /82 (Site: Right Upper Arm, Position: Sitting, Cuff Size: Large Adult)   Pulse 74   Wt 105.2 kg (232 lb)   LMP  (LMP Unknown)   BMI 38.49 kg/m²     Risks and benefits of lesion excision were discussed with Riya Bello and she does consent to proceed.  The nature of the procedure, along with the risks of bleeding, infection, scarring pain and recurrence were discussed with her.     Under local anesthetic the lesions were excised.  Hemostasis was obtained with direct pressure; electrocautery No; silver nitrate No; sutures with No. The wound was closed using simple interrupted 4-0 nylon.  The excised lesions qre about 2.2 and 1.5 cm in size.

## 2024-02-06 ENCOUNTER — PROCEDURE VISIT (OUTPATIENT)
Dept: UROLOGY | Age: 69
End: 2024-02-06
Payer: MEDICARE

## 2024-02-06 VITALS
DIASTOLIC BLOOD PRESSURE: 89 MMHG | SYSTOLIC BLOOD PRESSURE: 144 MMHG | HEART RATE: 73 BPM | BODY MASS INDEX: 38.65 KG/M2 | HEIGHT: 65 IN | TEMPERATURE: 97.7 F | WEIGHT: 232 LBS

## 2024-02-06 DIAGNOSIS — N39.3 FEMALE STRESS INCONTINENCE: Primary | ICD-10-CM

## 2024-02-06 DIAGNOSIS — M62.81 MUSCLE WEAKNESS: ICD-10-CM

## 2024-02-06 LAB — SURGICAL PATHOLOGY REPORT: NORMAL

## 2024-02-06 PROCEDURE — 97032 APPL MODALITY 1+ESTIM EA 15: CPT | Performed by: PHYSICIAN ASSISTANT

## 2024-02-06 PROCEDURE — 91122 PR ANORECTAL MANOMETRY: CPT | Performed by: PHYSICIAN ASSISTANT

## 2024-02-06 PROCEDURE — 51784 ANAL/URINARY MUSCLE STUDY: CPT | Performed by: PHYSICIAN ASSISTANT

## 2024-02-06 NOTE — PROGRESS NOTES
PELVIC FLOOR REHAB FOLLOW UP    At last visit (PFR # 1, 1 weeks ago):    Was able to hold for 4-5 seconds at low wavy amplitude.   Fatigue after 5 repetitions.      Home exercise regimen prescribed:   Repetitions - 5   Contract - 5 sec   Relax - 10 sec   + 10 Quick Flicks  Frequency- 4 times daily     Stimulated with 31 average mA for 8 minutes.     Symptoms  Daytime voiding frequency: q this depends on fluid intake  hrs, unchanged  Nighttime voiding frequency: 1-2x times per night, decreased  Urgency: mild, decreased    Incontinence episodes per day: 2-3, Causes: standing, with urge, with a full bladder, the closer she gets to the bathroom the less control she hasdecreased  Number of pads used per day: knix underwear somtiomes changing 3-4 times some days she can wear one pair all day , unchanged    Pelvic pain:  none    Bowel habits: unchanged  Fecal incontinence: none,unchanged    OVERALL IMPROVEMENT SINCE INITIAL SESSION:   mild    Compliance:  Has pt completed exercises as instructed: Yes    Changes Since Initial Visit  Intake of spicy/citrus/tomato based foods: unchanged  Caffeine intake per day: unchanged  Fluid intake per day: unchanged  Alcohol intake: unchanged  Smoking: unchanged    Was able to hold for 5-6 seconds at low wavy amplitude.   Fatigue after 6 repetitions.     Home exercise regimen prescribed:   Repetitions - 6   Contract - 6 sec   Relax - 10 sec   + 10 Quick Flicks  Frequency- 4 times daily    Stimulated with 30 average mA for 10 minutes.

## 2024-02-14 ENCOUNTER — PROCEDURE VISIT (OUTPATIENT)
Dept: UROLOGY | Age: 69
End: 2024-02-14
Payer: MEDICARE

## 2024-02-14 VITALS
DIASTOLIC BLOOD PRESSURE: 81 MMHG | SYSTOLIC BLOOD PRESSURE: 130 MMHG | BODY MASS INDEX: 38.27 KG/M2 | WEIGHT: 230 LBS | TEMPERATURE: 98 F | HEART RATE: 76 BPM

## 2024-02-14 DIAGNOSIS — M62.81 MUSCLE WEAKNESS: ICD-10-CM

## 2024-02-14 DIAGNOSIS — N39.3 FEMALE STRESS INCONTINENCE: Primary | ICD-10-CM

## 2024-02-14 PROCEDURE — 51784 ANAL/URINARY MUSCLE STUDY: CPT | Performed by: PHYSICIAN ASSISTANT

## 2024-02-14 PROCEDURE — 91122 PR ANORECTAL MANOMETRY: CPT | Performed by: PHYSICIAN ASSISTANT

## 2024-02-14 PROCEDURE — 97032 APPL MODALITY 1+ESTIM EA 15: CPT | Performed by: PHYSICIAN ASSISTANT

## 2024-02-14 NOTE — PROGRESS NOTES
PELVIC FLOOR REHAB FOLLOW UP    At last visit (PFR # 2, 1 weeks ago):    Was able to hold for 5-6 seconds at low wavy amplitude.   Fatigue after 6 repetitions.      Home exercise regimen prescribed:   Repetitions - 6   Contract - 6 sec   Relax - 10 sec   + 10 Quick Flicks  Frequency- 4 times daily     Stimulated with 30 average mA for 10 minutes.     Symptoms  Daytime voiding frequency: q This is still inconsistent. It depends on fluid intake.  hrs, unchanged  Nighttime voiding frequency: 1-2x times per night, decreased  Urgency: mild, decreased    Incontinence episodes per day: 2-3, Causes: standing, with urge, with a full bladder, the closer she gets to the bathroom the less control she has decreased   Number of pads used per day: knix underwear somtiomes changing 3-4 times some days she can wear one pair all day , unchanged    Pelvic pain: none, unchanged    Bowel habits: normal, Soft, Smal  unchanged  Fecal incontinence: none,unchanged    OVERALL IMPROVEMENT SINCE INITIAL SESSION:   mild    Compliance:  Has pt completed exercises as instructed: Yes, last week was difficult but she has tried to stay on schedule     Changes Since Initial Visit  Intake of spicy/citrus/tomato based foods: unchanged  Caffeine intake per day: unchanged  Fluid intake per day: unchanged  Alcohol intake: unchanged  Smoking: unchanged    Was able to hold for 6 seconds at coarse moderate amplitude.   Fatigue after 6 repetitions.     Home exercise regimen prescribed:   Repetitions - 7   Contract - 7 sec   Relax - 10 sec   + 10 Quick Flicks  Frequency- 4 times daily    Stimulated with 33 average mA for 12 minutes.

## 2024-02-20 ENCOUNTER — OFFICE VISIT (OUTPATIENT)
Dept: SURGERY | Age: 69
End: 2024-02-20

## 2024-02-20 VITALS
SYSTOLIC BLOOD PRESSURE: 158 MMHG | HEART RATE: 74 BPM | WEIGHT: 228 LBS | BODY MASS INDEX: 37.94 KG/M2 | DIASTOLIC BLOOD PRESSURE: 79 MMHG

## 2024-02-20 DIAGNOSIS — D17.22 LIPOMA OF LEFT UPPER EXTREMITY: Primary | ICD-10-CM

## 2024-02-20 PROCEDURE — 99024 POSTOP FOLLOW-UP VISIT: CPT | Performed by: SURGERY

## 2024-02-20 NOTE — PROGRESS NOTES
Riya Bello is here today for a post-operative wound check.  The wound appears clean, dry, intact, and nontender.    Sutures were removed today.  Steri-strips were applied   Pathology A. SOFT TISSUE, LEFT UPPER EXTREMITY, EXCISION:  Lipoma.      B. SOFT TISSUE, LEFT UPPER EXTREMITY, EXCISION:  Lipoma.    Follow-up prn

## 2024-02-21 ENCOUNTER — PROCEDURE VISIT (OUTPATIENT)
Dept: UROLOGY | Age: 69
End: 2024-02-21
Payer: MEDICARE

## 2024-02-21 VITALS
HEART RATE: 84 BPM | TEMPERATURE: 97.5 F | WEIGHT: 227 LBS | DIASTOLIC BLOOD PRESSURE: 79 MMHG | BODY MASS INDEX: 37.77 KG/M2 | SYSTOLIC BLOOD PRESSURE: 135 MMHG

## 2024-02-21 DIAGNOSIS — M62.81 MUSCLE WEAKNESS: ICD-10-CM

## 2024-02-21 DIAGNOSIS — N39.3 FEMALE STRESS INCONTINENCE: Primary | ICD-10-CM

## 2024-02-21 PROCEDURE — 91122 PR ANORECTAL MANOMETRY: CPT | Performed by: PHYSICIAN ASSISTANT

## 2024-02-21 PROCEDURE — 51784 ANAL/URINARY MUSCLE STUDY: CPT | Performed by: PHYSICIAN ASSISTANT

## 2024-02-21 PROCEDURE — 97032 APPL MODALITY 1+ESTIM EA 15: CPT | Performed by: PHYSICIAN ASSISTANT

## 2024-02-21 NOTE — PROGRESS NOTES
PELVIC FLOOR REHAB FOLLOW UP    At last visit (PFR # 3, 1 weeks ago):    Was able to hold for 6 seconds at coarse moderate amplitude.   Fatigue after 6 repetitions.      Home exercise regimen prescribed:   Repetitions - 7   Contract - 7 sec   Relax - 10 sec   + 10 Quick Flicks  Frequency- 4 times daily     Stimulated with 33 average mA for 12 minutes.    Symptoms  Daytime voiding frequency: q This is inconsistent. Its hard to keep track. She notices she has normal output with voiding instead of little amount here and there., unchanged  Nighttime voiding frequency: 1-2 times per night, unchanged  Urgency: mild, decreased    Incontinence episodes per day: 2-3, Causes: standing, with urge, with a full bladder, the closer she gets to the bathroom the less control she has but this is getting better  Number of pads used per day: 2-3 pairs of Knix Underwear Daily, decreased    Pelvic pain:NONE, unchanged    Bowel habits: unchanged  Fecal incontinence: none,unchanged    OVERALL IMPROVEMENT SINCE INITIAL SESSION:   mild    Compliance:  Has pt completed exercises as instructed: Yes    Changes Since Initial Visit  Intake of spicy/citrus/tomato based foods: unchanged  Caffeine intake per day: unchanged  Fluid intake per day: unchanged  Alcohol intake: unchanged  Smoking: unchanged    Was able to hold for 8 seconds at low wavy amplitude.   Fatigue after 8-9 repetitions.     Home exercise regimen prescribed:   Repetitions - 8   Contract - 8 sec   Relax - 10 sec   + 10 Quick Flicks  Frequency- 4 times daily    Stimulated with 31 average mA for 15 minutes.

## 2024-02-28 ENCOUNTER — PROCEDURE VISIT (OUTPATIENT)
Dept: UROLOGY | Age: 69
End: 2024-02-28
Payer: MEDICARE

## 2024-02-28 VITALS
HEIGHT: 65 IN | BODY MASS INDEX: 36.75 KG/M2 | TEMPERATURE: 98.6 F | SYSTOLIC BLOOD PRESSURE: 112 MMHG | WEIGHT: 220.6 LBS | DIASTOLIC BLOOD PRESSURE: 78 MMHG | HEART RATE: 79 BPM

## 2024-02-28 DIAGNOSIS — M62.81 MUSCLE WEAKNESS: ICD-10-CM

## 2024-02-28 DIAGNOSIS — N39.3 FEMALE STRESS INCONTINENCE: Primary | ICD-10-CM

## 2024-02-28 PROCEDURE — 97750 PHYSICAL PERFORMANCE TEST: CPT | Performed by: PHYSICIAN ASSISTANT

## 2024-02-28 PROCEDURE — 91122 PR ANORECTAL MANOMETRY: CPT | Performed by: PHYSICIAN ASSISTANT

## 2024-02-28 PROCEDURE — 97032 APPL MODALITY 1+ESTIM EA 15: CPT | Performed by: PHYSICIAN ASSISTANT

## 2024-02-28 PROCEDURE — 51784 ANAL/URINARY MUSCLE STUDY: CPT | Performed by: PHYSICIAN ASSISTANT

## 2024-02-28 NOTE — PROGRESS NOTES
PELVIC FLOOR REHAB FOLLOW UP    At last visit (PFR # 4, 1 weeks ago):  Was able to hold for 8 seconds at low wavy amplitude.   Fatigue after 8-9 repetitions.      Home exercise regimen prescribed:   Repetitions - 8   Contract - 8 sec   Relax - 10 sec   + 10 Quick Flicks  Frequency- 4 times daily     Stimulated with 31 average mA for 15 minutes.       Symptoms  Daytime voiding frequency: q every 3 hrs but this can vary, decreased  Nighttime voiding frequency: one times per night sometime not at all, decreased  Urgency: mild, decreased    Incontinence episodes per day: one time, Causes: with a full bladder, decreased  Number of pads used per day: 2 pairs of Knix underwear, decreased    Pelvic pain: unchanged    Bowel habits: unchanged  Fecal incontinence: unchanged    OVERALL IMPROVEMENT SINCE INITIAL SESSION:   moderate in degree    Compliance:  Has pt completed exercises as instructed: Yes    Changes Since Initial Visit  Intake of spicy/citrus/tomato based foods: unchanged  Caffeine intake per day: decreased  Fluid intake per day: slight increase  Alcohol intake: unchanged  Smoking: none,unchanged    Was able to hold for 9 seconds at low wavy amplitude.   Fatigue after 9 repetitions.     Home exercise regimen prescribed:   Repetitions - 9   Contract - 9 sec   Relax - 10 sec   + 10 Quick Flicks  Frequency- 4 times daily    Stimulated with 31.5 average mA for 15 minutes.

## 2024-03-06 ENCOUNTER — PROCEDURE VISIT (OUTPATIENT)
Dept: UROLOGY | Age: 69
End: 2024-03-06
Payer: MEDICARE

## 2024-03-06 VITALS
WEIGHT: 231 LBS | SYSTOLIC BLOOD PRESSURE: 145 MMHG | DIASTOLIC BLOOD PRESSURE: 82 MMHG | BODY MASS INDEX: 38.44 KG/M2 | HEART RATE: 87 BPM | TEMPERATURE: 96.9 F

## 2024-03-06 DIAGNOSIS — N39.3 FEMALE STRESS INCONTINENCE: Primary | ICD-10-CM

## 2024-03-06 DIAGNOSIS — M62.81 MUSCLE WEAKNESS: ICD-10-CM

## 2024-03-06 PROCEDURE — 91122 PR ANORECTAL MANOMETRY: CPT | Performed by: PHYSICIAN ASSISTANT

## 2024-03-06 PROCEDURE — 51784 ANAL/URINARY MUSCLE STUDY: CPT | Performed by: PHYSICIAN ASSISTANT

## 2024-03-06 PROCEDURE — 97032 APPL MODALITY 1+ESTIM EA 15: CPT | Performed by: PHYSICIAN ASSISTANT

## 2024-03-06 RX ORDER — MELOXICAM 15 MG/1
15 TABLET ORAL DAILY
COMMUNITY
Start: 2024-02-28 | End: 2024-03-29

## 2024-03-06 NOTE — PROGRESS NOTES
PELVIC FLOOR REHAB FOLLOW UP    At last visit (PFR # 5, 1 weeks ago):  Was able to hold for 9 seconds at low wavy amplitude.   Fatigue after 9 repetitions.      Home exercise regimen prescribed:   Repetitions - 9   Contract - 9 sec   Relax - 10 sec   + 10 Quick Flicks  Frequency- 4 times daily     Stimulated with 31.5 average mA for 15 minutes.      Symptoms  Daytime voiding frequency: q 2 hrs, unchanged  Nighttime voiding frequency: 0-1 times per night, unchanged  Urgency: mild, unchanged    Incontinence episodes per day: 0-1, Causes: with urge, full bladder, and after voiding when standingunchanged  Number of pads used per day: 2 pairs of Knix underwear daily, unchanged    Pelvic pain: unchanged, none  Pain with intercourse unchanged, none    Bowel habits: unchanged, daily  Fecal incontinence: unchanged, none    OVERALL IMPROVEMENT SINCE INITIAL SESSION:   moderate in degree    Compliance:  Has pt completed exercises as instructed: Yes    Changes Since Initial Visit  Intake of spicy/citrus/tomato based foods: unchanged  Caffeine intake per day: unchanged  Fluid intake per day: increased  Alcohol intake: unchanged  Smoking: unchanged     Was able to hold for 8-9 seconds at high to very low wavy amplitude.   Fatigue after 8-9 repetitions.     Home exercise regimen prescribed:   Repetitions - 9   Contract - 9 sec   Relax - 10 sec   + 10 Quick Flicks  Frequency- 4 times daily    Stimulated with 15 average mA for 15 minutes.

## 2024-03-19 ENCOUNTER — PROCEDURE VISIT (OUTPATIENT)
Dept: UROLOGY | Age: 69
End: 2024-03-19
Payer: MEDICARE

## 2024-03-19 VITALS
HEART RATE: 85 BPM | HEIGHT: 65 IN | TEMPERATURE: 97.3 F | SYSTOLIC BLOOD PRESSURE: 127 MMHG | DIASTOLIC BLOOD PRESSURE: 76 MMHG | BODY MASS INDEX: 38.69 KG/M2 | WEIGHT: 232.2 LBS

## 2024-03-19 DIAGNOSIS — M62.81 MUSCLE WEAKNESS: ICD-10-CM

## 2024-03-19 DIAGNOSIS — N39.3 FEMALE STRESS INCONTINENCE: Primary | ICD-10-CM

## 2024-03-19 PROCEDURE — 51784 ANAL/URINARY MUSCLE STUDY: CPT | Performed by: PHYSICIAN ASSISTANT

## 2024-03-19 PROCEDURE — 97032 APPL MODALITY 1+ESTIM EA 15: CPT | Performed by: PHYSICIAN ASSISTANT

## 2024-03-19 PROCEDURE — 91122 PR ANORECTAL MANOMETRY: CPT | Performed by: PHYSICIAN ASSISTANT

## 2024-03-19 NOTE — PROGRESS NOTES
PELVIC FLOOR REHAB FOLLOW UP    At last visit (PFR # 6, 2 weeks ago):    Was able to hold for 8-9 seconds at high to very low wavy amplitude.   Fatigue after 8-9 repetitions.      Home exercise regimen prescribed:   Repetitions - 9   Contract - 9 sec   Relax - 10 sec   + 10 Quick Flicks  Frequency- 4 times daily     Stimulated with 15 average mA for 15 minutes.     Symptoms  Daytime voiding frequency: q 2 hrs, unchanged  Nighttime voiding frequency: 0-1 times per night, unchanged  Urgency: mild, unchanged    Incontinence episodes per day: 0-1, Causes: standing, with urge, with a full bladder, unchanged  Number of pads used per day: 2 pairs of Knix underwear daily , unchanged    Pelvic pain: none,unchanged    Bowel habits: bowel habits seem to have improved,more regular and normal   Fecal incontinence: none,unchanged    OVERALL IMPROVEMENT SINCE INITIAL SESSION:   moderate    Compliance:  Has pt completed exercises as instructed: No: she thinks she completed two days of exercises     Changes Since Initial Visit  Intake of spicy/citrus/tomato based foods: unchanged  Caffeine intake per day: increased, more pop this week  Fluid intake per day: decreased  Alcohol intake: unchanged  Smoking: unchanged    Patient states that she had to deal with a family situation over the last 2 weeks.  She did miss last week's visit.  She did have to go back and forth to [place] for a family member who had a brain tumor.  She states that she had been unable to consistently do her exercises since then.    Was able to hold for 9 seconds at very low vavy amplitude.   Fatigue after 9 repetitions.     Home exercise regimen prescribed:   Repetitions - 9   Contract - 9 sec   Relax - 10 sec   + 10 Quick Flicks  Frequency- 4 times daily    Stimulated with 36 average mA for 15 minutes.

## 2024-03-26 ENCOUNTER — PROCEDURE VISIT (OUTPATIENT)
Dept: UROLOGY | Age: 69
End: 2024-03-26
Payer: MEDICARE

## 2024-03-26 VITALS
HEART RATE: 74 BPM | BODY MASS INDEX: 38.44 KG/M2 | TEMPERATURE: 97.3 F | WEIGHT: 231 LBS | DIASTOLIC BLOOD PRESSURE: 84 MMHG | SYSTOLIC BLOOD PRESSURE: 134 MMHG

## 2024-03-26 DIAGNOSIS — N39.3 FEMALE STRESS INCONTINENCE: Primary | ICD-10-CM

## 2024-03-26 DIAGNOSIS — M62.81 MUSCLE WEAKNESS: ICD-10-CM

## 2024-03-26 PROCEDURE — 91122 PR ANORECTAL MANOMETRY: CPT | Performed by: PHYSICIAN ASSISTANT

## 2024-03-26 PROCEDURE — 97032 APPL MODALITY 1+ESTIM EA 15: CPT | Performed by: PHYSICIAN ASSISTANT

## 2024-03-26 PROCEDURE — 97750 PHYSICAL PERFORMANCE TEST: CPT | Performed by: PHYSICIAN ASSISTANT

## 2024-03-26 PROCEDURE — 51784 ANAL/URINARY MUSCLE STUDY: CPT | Performed by: PHYSICIAN ASSISTANT

## 2024-03-26 NOTE — PROGRESS NOTES
PELVIC FLOOR REHAB FOLLOW UP    At last visit (PFR # 7, 1 weeks ago):  Was able to hold for 9 seconds at very low vavy amplitude.   Fatigue after 9 repetitions.      Home exercise regimen prescribed:   Repetitions - 9   Contract - 9 sec   Relax - 10 sec   + 10 Quick Flicks  Frequency- 4 times daily     Stimulated with 36 average mA for 15 minutes.      Symptoms  Daytime voiding frequency: q 3 hrs, decreased  Nighttime voiding frequency: 0-1 times per night, unchanged  Urgency: mild, unchanged    Incontinence episodes per day: 0-1, Causes: standing, with urge, with a full bladder, unchanged  Number of pads used per day: 1-2 Knix underware daily, decreased    Pelvic pain:  none    Bowel habits: unchanged, daily  Fecal incontinence: unchanged, none    OVERALL IMPROVEMENT SINCE INITIAL SESSION:   moderate in degree    Compliance:  Has pt completed exercises as instructed: Yes as best as she could    Changes Since Initial Visit  Intake of spicy/citrus/tomato based foods: unchanged  Caffeine intake per day: unchanged  Fluid intake per day: decreased  Alcohol intake: unchanged  Smoking: unchanged     Was able to hold for 9-10 seconds at low wavy amplitude.   Fatigue after 10 repetitions.     Home exercise regimen prescribed:   Repetitions - 10   Contract - 10 sec   Relax - 10 sec   + 10 Quick Flicks  Frequency- 4 times daily    Stimulated with 15 average mA for 15 minutes.     Stim only next week

## 2024-04-02 ENCOUNTER — PROCEDURE VISIT (OUTPATIENT)
Dept: UROLOGY | Age: 69
End: 2024-04-02
Payer: MEDICARE

## 2024-04-02 VITALS
WEIGHT: 234 LBS | SYSTOLIC BLOOD PRESSURE: 138 MMHG | DIASTOLIC BLOOD PRESSURE: 83 MMHG | HEART RATE: 80 BPM | TEMPERATURE: 98.7 F | BODY MASS INDEX: 38.94 KG/M2

## 2024-04-02 DIAGNOSIS — N39.3 FEMALE STRESS INCONTINENCE: Primary | ICD-10-CM

## 2024-04-02 DIAGNOSIS — M62.81 MUSCLE WEAKNESS: ICD-10-CM

## 2024-04-02 PROCEDURE — 97032 APPL MODALITY 1+ESTIM EA 15: CPT | Performed by: NURSE PRACTITIONER

## 2024-04-02 NOTE — PROGRESS NOTES
Patient here for PFR#9 stim only.        Was able to hold for 9-10 seconds at low wavy amplitude.   Fatigue after 10 repetitions.      Home exercise regimen prescribed:   Repetitions - 10   Contract - 10 sec   Relax - 10 sec   + 10 Quick Flicks  Frequency- 4 times daily     Stimulated with 15 average mA for 15 minutes.      Today  Here today for stim only    Stimulated with 41 average mA for 15 minutes.

## 2024-04-09 ENCOUNTER — PROCEDURE VISIT (OUTPATIENT)
Dept: UROLOGY | Age: 69
End: 2024-04-09
Payer: MEDICARE

## 2024-04-09 VITALS
WEIGHT: 230 LBS | HEART RATE: 87 BPM | DIASTOLIC BLOOD PRESSURE: 80 MMHG | BODY MASS INDEX: 38.27 KG/M2 | TEMPERATURE: 96.9 F | SYSTOLIC BLOOD PRESSURE: 126 MMHG

## 2024-04-09 DIAGNOSIS — N39.3 FEMALE STRESS INCONTINENCE: Primary | ICD-10-CM

## 2024-04-09 DIAGNOSIS — M62.81 MUSCLE WEAKNESS: ICD-10-CM

## 2024-04-09 PROCEDURE — 97750 PHYSICAL PERFORMANCE TEST: CPT | Performed by: PHYSICIAN ASSISTANT

## 2024-04-09 PROCEDURE — 91122 PR ANORECTAL MANOMETRY: CPT | Performed by: PHYSICIAN ASSISTANT

## 2024-04-09 PROCEDURE — 51784 ANAL/URINARY MUSCLE STUDY: CPT | Performed by: PHYSICIAN ASSISTANT

## 2024-04-09 PROCEDURE — 97032 APPL MODALITY 1+ESTIM EA 15: CPT | Performed by: PHYSICIAN ASSISTANT

## 2024-04-09 NOTE — PROGRESS NOTES
PELVIC FLOOR REHAB FOLLOW UP    At last visit (PFR # 10, 1 weeks ago):  STIM ONLY     Was able to hold for 9-10 seconds at low wavy amplitude.   Fatigue after 10 repetitions.      Home exercise regimen prescribed:   Repetitions - 10   Contract - 10 sec   Relax - 10 sec   + 10 Quick Flicks  Frequency- 4 times daily     Stimulated with 15 average mA for 15 minutes.        Today  Here today for stim only     Stimulated with 41 average mA for 15 minutes.  Symptoms  Daytime voiding frequency: q 3 hrs, unchanged  Nighttime voiding frequency: 0-1 times per night, unchanged  Urgency: mild, unchanged    Incontinence episodes per day: 0,unchanged. If she has the urge to void she has time to get to the bathroom now. If she does have leaking its due do pop intake.   Number of pads used per day: 1-2 [employer],mostly just changing in the morning for sanitary reasons. decreased    Pelvic pain: none, unchanged    Bowel habits: unchanged  Fecal incontinence: none,unchanged    OVERALL IMPROVEMENT SINCE INITIAL SESSION:   moderate    Compliance:  Has pt completed exercises as instructed: Yes    Changes Since Initial Visit  Intake of spicy/citrus/tomato based foods: increased (lasagna one supper)  Caffeine intake per day: unchanged  Fluid intake per day: unchanged  Alcohol intake: unchanged  Smoking: unchanged    Was able to hold for 11 seconds at moderate wavy amplitude.   Fatigue after 11 repetitions.     Home exercise regimen prescribed:   Repetitions - 11   Contract - 11 sec   Relax - 10 sec   + 11 Quick Flicks  Frequency- 4 times daily    Stimulated with 16 average mA for 15 minutes.

## 2024-04-16 ENCOUNTER — PROCEDURE VISIT (OUTPATIENT)
Dept: UROLOGY | Age: 69
End: 2024-04-16
Payer: MEDICARE

## 2024-04-16 VITALS
TEMPERATURE: 97.7 F | HEART RATE: 86 BPM | SYSTOLIC BLOOD PRESSURE: 145 MMHG | BODY MASS INDEX: 38.44 KG/M2 | WEIGHT: 231 LBS | DIASTOLIC BLOOD PRESSURE: 92 MMHG

## 2024-04-16 DIAGNOSIS — M62.81 MUSCLE WEAKNESS: ICD-10-CM

## 2024-04-16 DIAGNOSIS — N39.3 FEMALE STRESS INCONTINENCE: Primary | ICD-10-CM

## 2024-04-16 PROCEDURE — 91122 PR ANORECTAL MANOMETRY: CPT | Performed by: PHYSICIAN ASSISTANT

## 2024-04-16 PROCEDURE — 97032 APPL MODALITY 1+ESTIM EA 15: CPT | Performed by: PHYSICIAN ASSISTANT

## 2024-04-16 PROCEDURE — 51784 ANAL/URINARY MUSCLE STUDY: CPT | Performed by: PHYSICIAN ASSISTANT

## 2024-04-16 NOTE — PROGRESS NOTES
PELVIC FLOOR REHAB FOLLOW UP    At last visit (PFR # 11, 1 weeks ago):    Was able to hold for 11 seconds at moderate wavy amplitude.   Fatigue after 11 repetitions.      Home exercise regimen prescribed:   Repetitions - 11   Contract - 11 sec   Relax - 10 sec   + 11 Quick Flicks  Frequency- 4 times daily     Stimulated with 16 average mA for 15 minutes.   Symptoms  Daytime voiding frequency: q 3 hrs, unchanged  Nighttime voiding frequency: 2 times per night, increased  Urgency: mild, unchanged    Incontinence episodes per day: Today she noticed slight leaking after void in office. Patient states she did have chili 3-4 nights ago. She had a Dr Pepper with this meal.unchanged  Number of pads used per day: One pair at night One pair during day ([employer]) , unchanged    Pelvic pain: unchanged    Bowel habits: unchanged  Fecal incontinence: unchanged    OVERALL IMPROVEMENT SINCE INITIAL SESSION:   marked in degree    Compliance:  Has pt completed exercises as instructed: Yes    Changes Since Initial Visit  Intake of spicy/citrus/tomato based foods: increased  Caffeine intake per day: increased  Fluid intake per day: unchanged  Alcohol intake: unchanged  Smoking: unchanged    Was able to hold for 12 seconds at moderate wavy amplitude.   Fatigue after 12 repetitions.     Home exercise regimen prescribed:   Repetitions - 12   Contract - 12 sec   Relax - 10 sec   + 12 Quick Flicks  Frequency- 4 times daily    Stimulated with 14 average mA for 15 minutes.

## 2024-07-10 ENCOUNTER — OFFICE VISIT (OUTPATIENT)
Dept: UROLOGY | Age: 69
End: 2024-07-10
Payer: MEDICARE

## 2024-07-10 VITALS
SYSTOLIC BLOOD PRESSURE: 134 MMHG | BODY MASS INDEX: 38.77 KG/M2 | DIASTOLIC BLOOD PRESSURE: 73 MMHG | HEART RATE: 75 BPM | TEMPERATURE: 97.7 F | WEIGHT: 233 LBS

## 2024-07-10 DIAGNOSIS — N20.0 RENAL CALCULUS: ICD-10-CM

## 2024-07-10 DIAGNOSIS — M62.81 MUSCLE WEAKNESS: ICD-10-CM

## 2024-07-10 DIAGNOSIS — N39.3 FEMALE STRESS INCONTINENCE: Primary | ICD-10-CM

## 2024-07-10 PROCEDURE — 51798 US URINE CAPACITY MEASURE: CPT | Performed by: PHYSICIAN ASSISTANT

## 2024-07-10 PROCEDURE — G8417 CALC BMI ABV UP PARAM F/U: HCPCS | Performed by: PHYSICIAN ASSISTANT

## 2024-07-10 PROCEDURE — 1123F ACP DISCUSS/DSCN MKR DOCD: CPT | Performed by: PHYSICIAN ASSISTANT

## 2024-07-10 PROCEDURE — 0509F URINE INCON PLAN DOCD: CPT | Performed by: PHYSICIAN ASSISTANT

## 2024-07-10 PROCEDURE — 1090F PRES/ABSN URINE INCON ASSESS: CPT | Performed by: PHYSICIAN ASSISTANT

## 2024-07-10 PROCEDURE — 1036F TOBACCO NON-USER: CPT | Performed by: PHYSICIAN ASSISTANT

## 2024-07-10 PROCEDURE — PBSHW PR MEAS POST-VOIDING RESIDUAL URINE&/BLADDER CAP: Performed by: PHYSICIAN ASSISTANT

## 2024-07-10 PROCEDURE — 3017F COLORECTAL CA SCREEN DOC REV: CPT | Performed by: PHYSICIAN ASSISTANT

## 2024-07-10 PROCEDURE — G8399 PT W/DXA RESULTS DOCUMENT: HCPCS | Performed by: PHYSICIAN ASSISTANT

## 2024-07-10 PROCEDURE — G8427 DOCREV CUR MEDS BY ELIG CLIN: HCPCS | Performed by: PHYSICIAN ASSISTANT

## 2024-07-10 PROCEDURE — 99213 OFFICE O/P EST LOW 20 MIN: CPT | Performed by: PHYSICIAN ASSISTANT

## 2024-07-10 ASSESSMENT — ENCOUNTER SYMPTOMS
WHEEZING: 0
NAUSEA: 0
SHORTNESS OF BREATH: 0
COUGH: 0
EYE REDNESS: 0
ABDOMINAL PAIN: 0
COLOR CHANGE: 0
APNEA: 0
VOMITING: 0
BACK PAIN: 0
CONSTIPATION: 1

## 2024-07-10 NOTE — PATIENT INSTRUCTIONS
FOR CONSTIPATION    It is very important to have regular, soft, daily bowel movements, because it WILL improve your urinary symptoms.    Take Miralax (or generic equivalent) 17g once daily (one capful). Take every day, DO NOT skip days, as it must be taken daily in order to be effective. DO NOT take just \"as needed\". It is safe to take long term and is recommended for your symptoms.  If one dose daily causes loose stools/diarrhea, decrease to 1/2 capful or 1/4 capful. If you cannot tolerate this medication, please notify our office.     If one dose daily of Miralax is not sufficient to produce a soft, easy to pass daily stool, you may also add an over-the-counter stool softener capsule. For example: colace (docusate).     For Miralax to have maximal effectiveness, be sure to increase your water intake - aim for 80 oz daily unless you are on a fluid-restriction from another provider.     SURVEY:    You may be receiving a survey from [employer] regarding your visit today.    Please complete the survey to enable us to provide the highest quality of care to you and your family.    If you cannot score us a very good on any question, please call the office to discuss how we could have made your experience a very good one.    Thank you.    BLADDER IRRITANTS     There are several changes you can make to your diet to help improve your urinary symptoms.     The following have been shown to cause irritation to the bladder and should be AVOIDED if possible:  ~ Coffee (including decaffeinated)   ~ ALL Tea (including green teas and decaffeinated)  ~ Soda/Pop/carbonated beverages/Energy drinks (especially dark dyed letty, root beers, mountain dew, etc)  ~These are MUCH worse if they have caffeine, but can also be irritative to the bladder even without caffeine  ~ Alcoholic beverages  ~ Spicy foods (peppers contain capsaicin, which is very irritating to the bladder)  ~ Acidic foods (for example: tomato based foods, orange juice,

## 2024-07-10 NOTE — PROGRESS NOTES
Bladderscan performed in office today:  Patient voided - 100 mL, PVR - 50 mL   
acute distress;   Neuro: alert and oriented to person place and time.    Psych: Mood and affect normal.  Lungs: Respiratory effort normal  Abdomen: Soft, non-tender, non-distended       Lab Results   Component Value Date    BUN 13 07/02/2024     Lab Results   Component Value Date    CREATININE 0.77 07/02/2024     No results found for: \"PSA\"    ASSESSMENT:   Diagnosis Orders   1. Female stress incontinence  WI MAITE POST-VOIDING RESIDUAL URINE&/BLADDER CAP      2. Muscle weakness        3. Renal calculus  XR ABDOMEN (KUB) (SINGLE AP VIEW)            PLAN:  Stone follow-up 1/2025    Start Miralax    Resume PFR exercises     Follow-up in January with KUB and PVR

## 2024-12-26 ENCOUNTER — HOSPITAL ENCOUNTER (OUTPATIENT)
Dept: CT IMAGING | Age: 69
Discharge: HOME | End: 2024-12-28
Attending: INTERNAL MEDICINE
Payer: MEDICARE

## 2024-12-26 DIAGNOSIS — R10.32 LLQ PAIN: ICD-10-CM

## 2024-12-26 PROCEDURE — 74176 CT ABD & PELVIS W/O CONTRAST: CPT

## 2024-12-27 ENCOUNTER — HOSPITAL ENCOUNTER (OUTPATIENT)
Age: 69
Discharge: HOME | End: 2024-12-27
Payer: MEDICARE

## 2024-12-27 DIAGNOSIS — K85.90 ACUTE PANCREATITIS, UNSPECIFIED COMPLICATION STATUS, UNSPECIFIED PANCREATITIS TYPE: ICD-10-CM

## 2024-12-27 LAB — LIPASE SERPL-CCNC: 22 U/L (ref 13–60)

## 2024-12-27 PROCEDURE — 36415 COLL VENOUS BLD VENIPUNCTURE: CPT

## 2024-12-27 PROCEDURE — 83690 ASSAY OF LIPASE: CPT

## 2025-01-06 ENCOUNTER — TELEPHONE (OUTPATIENT)
Dept: UROLOGY | Age: 70
End: 2025-01-06

## 2025-01-06 NOTE — TELEPHONE ENCOUNTER
After pre-chart planning it was discovered that the patient had a CT scan 12/26/2024. Would you still like a KUB prior to 01/14/2025? Please advise.

## 2025-01-14 ENCOUNTER — OFFICE VISIT (OUTPATIENT)
Dept: UROLOGY | Age: 70
End: 2025-01-14
Payer: MEDICARE

## 2025-01-14 VITALS
DIASTOLIC BLOOD PRESSURE: 82 MMHG | HEIGHT: 65 IN | TEMPERATURE: 97.7 F | BODY MASS INDEX: 37.82 KG/M2 | WEIGHT: 227 LBS | SYSTOLIC BLOOD PRESSURE: 122 MMHG

## 2025-01-14 DIAGNOSIS — N39.41 URGE INCONTINENCE: ICD-10-CM

## 2025-01-14 DIAGNOSIS — N20.0 RENAL CALCULUS: Primary | ICD-10-CM

## 2025-01-14 DIAGNOSIS — N95.8 GENITOURINARY SYNDROME OF MENOPAUSE: ICD-10-CM

## 2025-01-14 PROCEDURE — G8417 CALC BMI ABV UP PARAM F/U: HCPCS | Performed by: NURSE PRACTITIONER

## 2025-01-14 PROCEDURE — 3017F COLORECTAL CA SCREEN DOC REV: CPT | Performed by: NURSE PRACTITIONER

## 2025-01-14 PROCEDURE — G8399 PT W/DXA RESULTS DOCUMENT: HCPCS | Performed by: NURSE PRACTITIONER

## 2025-01-14 PROCEDURE — G8427 DOCREV CUR MEDS BY ELIG CLIN: HCPCS | Performed by: NURSE PRACTITIONER

## 2025-01-14 PROCEDURE — 1090F PRES/ABSN URINE INCON ASSESS: CPT | Performed by: NURSE PRACTITIONER

## 2025-01-14 PROCEDURE — 99214 OFFICE O/P EST MOD 30 MIN: CPT | Performed by: NURSE PRACTITIONER

## 2025-01-14 PROCEDURE — 1123F ACP DISCUSS/DSCN MKR DOCD: CPT | Performed by: NURSE PRACTITIONER

## 2025-01-14 PROCEDURE — 0509F URINE INCON PLAN DOCD: CPT | Performed by: NURSE PRACTITIONER

## 2025-01-14 PROCEDURE — 51798 US URINE CAPACITY MEASURE: CPT | Performed by: NURSE PRACTITIONER

## 2025-01-14 PROCEDURE — PBSHW MEAS,POST-VOID RES,US,NON-IMAGING: Performed by: NURSE PRACTITIONER

## 2025-01-14 PROCEDURE — 1159F MED LIST DOCD IN RCRD: CPT | Performed by: NURSE PRACTITIONER

## 2025-01-14 PROCEDURE — 1036F TOBACCO NON-USER: CPT | Performed by: NURSE PRACTITIONER

## 2025-01-14 RX ORDER — ESTRADIOL 0.1 MG/G
CREAM VAGINAL
Qty: 42.5 G | Refills: 3 | Status: SHIPPED | OUTPATIENT
Start: 2025-01-14

## 2025-01-14 NOTE — PROGRESS NOTES
History  Previous patient of Dr. Bangura.     3/2009 left ESWL for 16x8mm and 6x6mm stones. 100% uric acid stones. 7/2012 on urocit-k 15 mEq BID and allopurinol 300mg daily     3/2017 Referred by PCP due to microhematuria, UA showed 20-50 RBCs, it also showed uric acid crystals. Denies any gross hematuria.  CT urogram showed a 25 x 20 mm stone in the lower pole the left kidney.       5/2/2017 and 5/9/2017 staged left ESWL     6/2018 left HLL    4/2024 - completed 12 sessions of pelvic floor rehabilitation    Today  Here today to follow-up for stones and incontinence.  She denies any dysuria or gross hematuria.  She was evaluated in the ER in December due to left lower quadrant pain.  She did have a CT scan completed.  This film was independently reviewed and shows no hydroureteronephrosis or ureteral stones.  There is a calcification of the left kidney in the lower pole, this does appear to be parenchymal.  She denies any stress incontinence.  She is complaining of urge incontinence and what sounds like vaginal voiding.  She does have a bowel movement daily.  She urinates every 3 hours while awake.  She denies nocturia.    Plan  Educated on improved voiding position to prevent vaginal voiding    Start estradiol for minimal urge incontinence    F/U in 6 months to reevaluate urinary symptoms [we will need to order CT abdomen and pelvis due to history of uric acid stones at this visit]

## 2025-01-14 NOTE — PATIENT INSTRUCTIONS
Estrogen cream: Insert one inch of cream inside the vagina and place an additional pea sized amount to the urethra and inner labia three nights per week. Do NOT use plastic applicator       SURVEY:    You may be receiving a survey from "[employer] regarding your visit today.    Please complete the survey to enable us to provide the highest quality of care to you and your family.    If you cannot score us a very good on any question, please call the office to discuss how we could have made your experience a very good one.    Thank you.

## 2025-01-15 ENCOUNTER — TELEPHONE (OUTPATIENT)
Dept: UROLOGY | Age: 70
End: 2025-01-15

## 2025-01-15 NOTE — TELEPHONE ENCOUNTER
At [facility] it will be $29.92 with [facility]    Please call that in for her

## 2025-03-24 ENCOUNTER — HOSPITAL ENCOUNTER (OUTPATIENT)
Dept: WOMENS IMAGING | Age: 70
Discharge: HOME | End: 2025-03-26
Payer: MEDICARE

## 2025-03-24 DIAGNOSIS — Z12.31 SCREENING MAMMOGRAM FOR BREAST CANCER: ICD-10-CM

## 2025-03-24 PROCEDURE — 77063 BREAST TOMOSYNTHESIS BI: CPT

## 2025-06-26 ENCOUNTER — OFFICE VISIT (OUTPATIENT)
Dept: UROLOGY | Age: 70
End: 2025-06-26
Payer: MEDICARE

## 2025-06-26 VITALS
DIASTOLIC BLOOD PRESSURE: 80 MMHG | BODY MASS INDEX: 37.61 KG/M2 | WEIGHT: 226 LBS | SYSTOLIC BLOOD PRESSURE: 134 MMHG | TEMPERATURE: 97.2 F

## 2025-06-26 DIAGNOSIS — N95.8 GENITOURINARY SYNDROME OF MENOPAUSE: ICD-10-CM

## 2025-06-26 DIAGNOSIS — N20.0 URIC ACID KIDNEY STONE: Primary | ICD-10-CM

## 2025-06-26 DIAGNOSIS — N39.46 MIXED INCONTINENCE: ICD-10-CM

## 2025-06-26 PROCEDURE — 3017F COLORECTAL CA SCREEN DOC REV: CPT | Performed by: NURSE PRACTITIONER

## 2025-06-26 PROCEDURE — G8427 DOCREV CUR MEDS BY ELIG CLIN: HCPCS | Performed by: NURSE PRACTITIONER

## 2025-06-26 PROCEDURE — G8417 CALC BMI ABV UP PARAM F/U: HCPCS | Performed by: NURSE PRACTITIONER

## 2025-06-26 PROCEDURE — PBSHW MEAS,POST-VOID RES,US,NON-IMAGING: Performed by: NURSE PRACTITIONER

## 2025-06-26 PROCEDURE — 51798 US URINE CAPACITY MEASURE: CPT | Performed by: NURSE PRACTITIONER

## 2025-06-26 PROCEDURE — 99213 OFFICE O/P EST LOW 20 MIN: CPT | Performed by: NURSE PRACTITIONER

## 2025-06-26 PROCEDURE — 1159F MED LIST DOCD IN RCRD: CPT | Performed by: NURSE PRACTITIONER

## 2025-06-26 PROCEDURE — 1123F ACP DISCUSS/DSCN MKR DOCD: CPT | Performed by: NURSE PRACTITIONER

## 2025-06-26 PROCEDURE — 0509F URINE INCON PLAN DOCD: CPT | Performed by: NURSE PRACTITIONER

## 2025-06-26 PROCEDURE — G8399 PT W/DXA RESULTS DOCUMENT: HCPCS | Performed by: NURSE PRACTITIONER

## 2025-06-26 PROCEDURE — 1036F TOBACCO NON-USER: CPT | Performed by: NURSE PRACTITIONER

## 2025-06-26 PROCEDURE — 1090F PRES/ABSN URINE INCON ASSESS: CPT | Performed by: NURSE PRACTITIONER

## 2025-06-26 RX ORDER — ESTRADIOL 0.1 MG/G
CREAM VAGINAL
Qty: 42.5 G | Refills: 3 | Status: SHIPPED | OUTPATIENT
Start: 2025-06-26

## 2025-06-26 NOTE — PROGRESS NOTES
History  Previous patient of Dr. Bangura.     3/2009 left ESWL for 16x8mm and 6x6mm stones. 100% uric acid stones. 7/2012 on urocit-k 15 mEq BID and allopurinol 300mg daily     3/2017 Referred by PCP due to microhematuria, UA showed 20-50 RBCs, it also showed uric acid crystals. Denies any gross hematuria.  CT urogram showed a 25 x 20 mm stone in the lower pole the left kidney.       5/2/2017 and 5/9/2017 staged left ESWL     6/2018 left HLL    4/2024 - completed 12 sessions of pelvic floor rehabilitation    1/2025 She denies any stress incontinence.  She is complaining of urge incontinence and what sounds like vaginal voiding.  She does have a bowel movement daily.  She urinates every 3 hours while awake.  She denies nocturia.   Estradiol     Educated on improved voiding position to prevent vaginal voiding       Today  Here today to follow-up for  incontinence.  She denies any dysuria or gross hematuria. She does have a bowel movement daily.  She urinates every 3 hours while awake.  She denies nocturia. Denies incontinence. She has stopped drinking dark soda.    Plan  In 1 year she is due for stone f/u with a CT scan prior due to uric acid stones    Continue estradiol three nights per week    Continue pelvic floor exercises

## (undated) DEVICE — 3M™ STERI-STRIP™ REINFORCED ADHESIVE SKIN CLOSURES, R1541, 1/4 IN X 3 IN (6 MM X 75 MM), 3 STRIPS/ENVELOPE: Brand: 3M™ STERI-STRIP™

## (undated) DEVICE — DUAL LUMEN URETERAL CATHETER

## (undated) DEVICE — Device

## (undated) DEVICE — SYRINGE MED 10ML LUERLOCK TIP W/O SFTY DISP

## (undated) DEVICE — MEDI-VAC NON-CONDUCTIVE SUCTION TUBING 6MM X 6.1M (20 FT.) L: Brand: CARDINAL HEALTH

## (undated) DEVICE — SOLUTION SURG PREP ANTIMICROBIAL 4 OZ SKIN WND EXIDINE

## (undated) DEVICE — TUBING SUCT NON-STRL 9/32X100 W/CNNT

## (undated) DEVICE — ACUSNARE POLYPECTOMY SNARE: Brand: ACUSNARE

## (undated) DEVICE — SOLUTION IV IRRIG WATER 1000ML POUR BRL 2F7114

## (undated) DEVICE — PADDING,UNDERCAST,COTTON, 3X4YD STERILE: Brand: MEDLINE

## (undated) DEVICE — Z DISCONTINUED BY MEDLINE USE 2711682 TRAY SKIN PREP DRY W/ PREM GLV

## (undated) DEVICE — GLOVE ORANGE PI 7 1/2   MSG9075

## (undated) DEVICE — BANDAGE COMPR W4INXL9FT EXSANG SGL LAYERED NO CLSR ESMARCH

## (undated) DEVICE — SYRINGE MED 20ML STD CLR PLAS LUERLOCK TIP N CTRL DISP

## (undated) DEVICE — GOWN,AURORA,NON-REINFORCED,2XL: Brand: MEDLINE

## (undated) DEVICE — MASTISOL ADHESIVE LIQ 2/3ML

## (undated) DEVICE — GLOVE SURG SZ 75 L12IN FNGR THK87MIL WHT LTX FREE

## (undated) DEVICE — DRESSING PETRO W3XL3IN OIL EMUL N ADH GZ KNIT IMPREG CELOS

## (undated) DEVICE — BLADE OPHTH 180DEG CUT SURF BLU STR SHRP DBL BVL GRINDLESS

## (undated) DEVICE — GUIDEWIRE VASC STR 3 CM 0.035 INX150 CM STD NIT ZIPWIRE

## (undated) DEVICE — NEEDLE FLTR 18GA L1.5IN MEM THK5UM BLNT DISP

## (undated) DEVICE — SOLUTION IV IRRIG POUR BRL 0.9% SODIUM CHL 2F7124

## (undated) DEVICE — SUTURE NONABSORBABLE MONOFILAMENT 3-0 PS-1 18 IN BLK ETHILON 1663H

## (undated) DEVICE — NEEDLE HYPO 27GA L1.25IN GRY POLYPR HUB S STL REG BVL STR

## (undated) DEVICE — SOLUTION SURG PREP CHLORHEXIDINE GLUC 4% 8 OZ EXIDINE

## (undated) DEVICE — CYSTOSCOPY PACK: Brand: CONVERTORS

## (undated) DEVICE — Z INACTIVE USE 2660664 SOLUTION IRRIG 3000ML 0.9% SOD CHL USP UROMATIC PLAS CONT

## (undated) DEVICE — Z DUP USE 2139333 GUIDEWIRE UROLOGICAL STR STD 0.035 IN X150 CM REG ZIPWIRE LF

## (undated) DEVICE — 4-PORT MANIFOLD: Brand: NEPTUNE 2

## (undated) DEVICE — Z DISCONTINUED USE 2624853 GLOVE SURG SZ 75 L12IN THK91MIL BRN LTX FREE

## (undated) DEVICE — GLOVE SURG SZ 75 L12IN FNGR THK87MIL DK GRN LTX FREE ISOLEX

## (undated) DEVICE — STANDARD HYPODERMIC NEEDLE,ALUMINUM HUB: Brand: MONOJECT

## (undated) DEVICE — HAND AND FT PK

## (undated) DEVICE — CANNULA ORAL NSL AD CO2 N INTUB O2 DEL DISP TRU LNK

## (undated) DEVICE — SINGLE ACTION PUMPING SYSTEM

## (undated) DEVICE — ZIMMER® STERILE DISPOSABLE TOURNIQUET CUFF WITH PROTECTIVE SLEEVE AND PLC, SINGLE PORT, SINGLE BLADDER, 18 IN. (46 CM)

## (undated) DEVICE — ADAPTER URO SCP UROLOK LL

## (undated) DEVICE — SPONGE GZ W4XL4IN CELOS POSTOP AVANT